# Patient Record
Sex: FEMALE | Race: WHITE | Employment: OTHER | URBAN - NONMETROPOLITAN AREA
[De-identification: names, ages, dates, MRNs, and addresses within clinical notes are randomized per-mention and may not be internally consistent; named-entity substitution may affect disease eponyms.]

---

## 2017-01-01 ENCOUNTER — OFFICE VISIT (OUTPATIENT)
Dept: NEUROLOGY | Age: 82
End: 2017-01-01
Payer: MEDICARE

## 2017-01-01 ENCOUNTER — OFFICE VISIT (OUTPATIENT)
Dept: PAIN MANAGEMENT | Age: 82
End: 2017-01-01
Payer: MEDICARE

## 2017-01-01 ENCOUNTER — TELEPHONE (OUTPATIENT)
Dept: PAIN MANAGEMENT | Age: 82
End: 2017-01-01

## 2017-01-01 ENCOUNTER — TELEPHONE (OUTPATIENT)
Dept: FAMILY MEDICINE CLINIC | Age: 82
End: 2017-01-01

## 2017-01-01 ENCOUNTER — NURSE ONLY (OUTPATIENT)
Dept: LAB | Age: 82
End: 2017-01-01
Payer: MEDICARE

## 2017-01-01 ENCOUNTER — HOSPITAL ENCOUNTER (OUTPATIENT)
Age: 82
Setting detail: OUTPATIENT SURGERY
Discharge: HOME | End: 2017-09-15
Attending: PHYSICAL MEDICINE & REHABILITATION | Admitting: PHYSICAL MEDICINE & REHABILITATION
Payer: MEDICARE

## 2017-01-01 ENCOUNTER — HOSPITAL ENCOUNTER (OUTPATIENT)
Age: 82
Setting detail: OUTPATIENT SURGERY
Discharge: HOME | End: 2017-10-03
Attending: PHYSICAL MEDICINE & REHABILITATION | Admitting: PHYSICAL MEDICINE & REHABILITATION
Payer: MEDICARE

## 2017-01-01 ENCOUNTER — HOSPITAL ENCOUNTER (EMERGENCY)
Age: 82
Discharge: HOME | End: 2017-09-18
Attending: SPECIALIST
Payer: MEDICARE

## 2017-01-01 ENCOUNTER — APPOINTMENT (OUTPATIENT)
Dept: GENERAL RADIOLOGY | Age: 82
End: 2017-01-01
Attending: PHYSICAL MEDICINE & REHABILITATION
Payer: MEDICARE

## 2017-01-01 ENCOUNTER — HOSPITAL ENCOUNTER (OUTPATIENT)
Dept: MRI IMAGING | Age: 82
Discharge: HOME | End: 2017-09-27
Payer: MEDICARE

## 2017-01-01 ENCOUNTER — APPOINTMENT (OUTPATIENT)
Dept: CT IMAGING | Age: 82
End: 2017-01-01
Payer: MEDICARE

## 2017-01-01 ENCOUNTER — HOSPITAL ENCOUNTER (OUTPATIENT)
Age: 82
Setting detail: OUTPATIENT SURGERY
Discharge: HOME | End: 2017-11-03
Attending: PHYSICAL MEDICINE & REHABILITATION | Admitting: PHYSICAL MEDICINE & REHABILITATION
Payer: MEDICARE

## 2017-01-01 ENCOUNTER — OFFICE VISIT (OUTPATIENT)
Dept: FAMILY MEDICINE CLINIC | Age: 82
End: 2017-01-01
Payer: MEDICARE

## 2017-01-01 ENCOUNTER — HOSPITAL ENCOUNTER (OUTPATIENT)
Dept: GENERAL RADIOLOGY | Age: 82
Discharge: HOME | End: 2017-09-20
Payer: MEDICARE

## 2017-01-01 VITALS
HEIGHT: 69 IN | TEMPERATURE: 97.8 F | WEIGHT: 180 LBS | RESPIRATION RATE: 16 BRPM | DIASTOLIC BLOOD PRESSURE: 75 MMHG | BODY MASS INDEX: 26.66 KG/M2 | HEART RATE: 74 BPM | SYSTOLIC BLOOD PRESSURE: 129 MMHG | OXYGEN SATURATION: 100 %

## 2017-01-01 VITALS
HEIGHT: 68 IN | WEIGHT: 184 LBS | HEART RATE: 86 BPM | DIASTOLIC BLOOD PRESSURE: 80 MMHG | BODY MASS INDEX: 27.89 KG/M2 | SYSTOLIC BLOOD PRESSURE: 124 MMHG

## 2017-01-01 VITALS
OXYGEN SATURATION: 100 % | WEIGHT: 186.4 LBS | DIASTOLIC BLOOD PRESSURE: 80 MMHG | RESPIRATION RATE: 16 BRPM | HEART RATE: 73 BPM | TEMPERATURE: 97.9 F | SYSTOLIC BLOOD PRESSURE: 139 MMHG | BODY MASS INDEX: 31.82 KG/M2 | HEIGHT: 64 IN

## 2017-01-01 VITALS
BODY MASS INDEX: 27.9 KG/M2 | HEART RATE: 98 BPM | DIASTOLIC BLOOD PRESSURE: 66 MMHG | SYSTOLIC BLOOD PRESSURE: 118 MMHG | WEIGHT: 184.08 LBS | HEIGHT: 68 IN | OXYGEN SATURATION: 98 %

## 2017-01-01 VITALS
SYSTOLIC BLOOD PRESSURE: 130 MMHG | HEIGHT: 68 IN | HEART RATE: 88 BPM | WEIGHT: 183.8 LBS | DIASTOLIC BLOOD PRESSURE: 82 MMHG | BODY MASS INDEX: 27.86 KG/M2

## 2017-01-01 VITALS
RESPIRATION RATE: 16 BRPM | HEART RATE: 80 BPM | DIASTOLIC BLOOD PRESSURE: 78 MMHG | BODY MASS INDEX: 30.66 KG/M2 | SYSTOLIC BLOOD PRESSURE: 128 MMHG | HEIGHT: 65 IN | WEIGHT: 184 LBS

## 2017-01-01 VITALS
TEMPERATURE: 97.3 F | SYSTOLIC BLOOD PRESSURE: 127 MMHG | DIASTOLIC BLOOD PRESSURE: 68 MMHG | HEART RATE: 69 BPM | RESPIRATION RATE: 18 BRPM | OXYGEN SATURATION: 95 %

## 2017-01-01 VITALS
BODY MASS INDEX: 28.34 KG/M2 | HEIGHT: 68 IN | HEART RATE: 68 BPM | WEIGHT: 187 LBS | DIASTOLIC BLOOD PRESSURE: 80 MMHG | SYSTOLIC BLOOD PRESSURE: 140 MMHG

## 2017-01-01 VITALS
BODY MASS INDEX: 27.89 KG/M2 | HEIGHT: 68 IN | TEMPERATURE: 97.9 F | WEIGHT: 184 LBS | DIASTOLIC BLOOD PRESSURE: 81 MMHG | OXYGEN SATURATION: 99 % | SYSTOLIC BLOOD PRESSURE: 146 MMHG | HEART RATE: 76 BPM | RESPIRATION RATE: 16 BRPM

## 2017-01-01 VITALS
WEIGHT: 189 LBS | SYSTOLIC BLOOD PRESSURE: 114 MMHG | HEIGHT: 69 IN | HEART RATE: 78 BPM | DIASTOLIC BLOOD PRESSURE: 88 MMHG | BODY MASS INDEX: 27.99 KG/M2

## 2017-01-01 VITALS
WEIGHT: 183 LBS | BODY MASS INDEX: 27.11 KG/M2 | HEART RATE: 92 BPM | SYSTOLIC BLOOD PRESSURE: 138 MMHG | DIASTOLIC BLOOD PRESSURE: 86 MMHG | HEIGHT: 69 IN

## 2017-01-01 VITALS
SYSTOLIC BLOOD PRESSURE: 118 MMHG | HEART RATE: 88 BPM | HEIGHT: 69 IN | WEIGHT: 182 LBS | DIASTOLIC BLOOD PRESSURE: 78 MMHG | BODY MASS INDEX: 26.96 KG/M2

## 2017-01-01 DIAGNOSIS — F01.518 VASCULAR DEMENTIA WITH BEHAVIOR DISTURBANCE: ICD-10-CM

## 2017-01-01 DIAGNOSIS — M54.50 CHRONIC LOW BACK PAIN WITHOUT SCIATICA, UNSPECIFIED BACK PAIN LATERALITY: ICD-10-CM

## 2017-01-01 DIAGNOSIS — G89.29 CHRONIC RIGHT-SIDED LOW BACK PAIN WITH RIGHT-SIDED SCIATICA: ICD-10-CM

## 2017-01-01 DIAGNOSIS — F32.A DEPRESSION, UNSPECIFIED DEPRESSION TYPE: Primary | ICD-10-CM

## 2017-01-01 DIAGNOSIS — M54.41 CHRONIC RIGHT-SIDED LOW BACK PAIN WITH RIGHT-SIDED SCIATICA: ICD-10-CM

## 2017-01-01 DIAGNOSIS — G89.29 CHRONIC LOW BACK PAIN WITHOUT SCIATICA, UNSPECIFIED BACK PAIN LATERALITY: ICD-10-CM

## 2017-01-01 DIAGNOSIS — R10.31 RLQ ABDOMINAL PAIN: Primary | ICD-10-CM

## 2017-01-01 DIAGNOSIS — Z99.89 OSA ON CPAP: ICD-10-CM

## 2017-01-01 DIAGNOSIS — M47.816 LUMBAR FACET JOINT SYNDROME: Primary | ICD-10-CM

## 2017-01-01 DIAGNOSIS — M79.602 LEFT ARM PAIN: ICD-10-CM

## 2017-01-01 DIAGNOSIS — I67.82 CHRONIC CEREBRAL ISCHEMIA: ICD-10-CM

## 2017-01-01 DIAGNOSIS — M54.50 LOW BACK PAIN RADIATING TO RIGHT LEG: ICD-10-CM

## 2017-01-01 DIAGNOSIS — M53.3 CHRONIC SI JOINT PAIN: ICD-10-CM

## 2017-01-01 DIAGNOSIS — M54.16 CHRONIC LUMBAR RADICULOPATHY: ICD-10-CM

## 2017-01-01 DIAGNOSIS — M54.41 CHRONIC RIGHT-SIDED LOW BACK PAIN WITH RIGHT-SIDED SCIATICA: Primary | ICD-10-CM

## 2017-01-01 DIAGNOSIS — F32.A DEPRESSION, UNSPECIFIED DEPRESSION TYPE: ICD-10-CM

## 2017-01-01 DIAGNOSIS — M54.16 LUMBAR RADICULOPATHY: Primary | ICD-10-CM

## 2017-01-01 DIAGNOSIS — G47.9 SLEEP DIFFICULTIES: ICD-10-CM

## 2017-01-01 DIAGNOSIS — G89.29 CHRONIC SI JOINT PAIN: ICD-10-CM

## 2017-01-01 DIAGNOSIS — F02.818 LATE ONSET ALZHEIMER'S DISEASE WITH BEHAVIORAL DISTURBANCE (HCC): Primary | ICD-10-CM

## 2017-01-01 DIAGNOSIS — R41.0 CONFUSION: ICD-10-CM

## 2017-01-01 DIAGNOSIS — M54.06 PANNICULITIS INVOLVING LUMBAR REGION: ICD-10-CM

## 2017-01-01 DIAGNOSIS — G89.29 CHRONIC RIGHT-SIDED LOW BACK PAIN WITH RIGHT-SIDED SCIATICA: Primary | ICD-10-CM

## 2017-01-01 DIAGNOSIS — M48.061 NEURAL FORAMINAL STENOSIS OF LUMBAR SPINE: ICD-10-CM

## 2017-01-01 DIAGNOSIS — H90.3 SENSORINEURAL HEARING LOSS, BILATERAL: ICD-10-CM

## 2017-01-01 DIAGNOSIS — M54.50 LOW BACK PAIN RADIATING TO RIGHT LEG: Primary | ICD-10-CM

## 2017-01-01 DIAGNOSIS — I10 ESSENTIAL HYPERTENSION: ICD-10-CM

## 2017-01-01 DIAGNOSIS — R10.31 RIGHT GROIN PAIN: ICD-10-CM

## 2017-01-01 DIAGNOSIS — M47.816 LUMBAR FACET JOINT SYNDROME: ICD-10-CM

## 2017-01-01 DIAGNOSIS — M79.604 LOW BACK PAIN RADIATING TO RIGHT LEG: Primary | ICD-10-CM

## 2017-01-01 DIAGNOSIS — F34.1 DYSTHYMIA: ICD-10-CM

## 2017-01-01 DIAGNOSIS — G45.9 TRANSIENT CEREBRAL ISCHEMIA, UNSPECIFIED TYPE: ICD-10-CM

## 2017-01-01 DIAGNOSIS — M79.604 LOW BACK PAIN RADIATING TO RIGHT LEG: ICD-10-CM

## 2017-01-01 DIAGNOSIS — M54.16 LUMBAR RADICULOPATHY: ICD-10-CM

## 2017-01-01 DIAGNOSIS — G30.1 LATE ONSET ALZHEIMER'S DISEASE WITH BEHAVIORAL DISTURBANCE (HCC): Primary | ICD-10-CM

## 2017-01-01 DIAGNOSIS — R41.3 MEMORY PROBLEM: ICD-10-CM

## 2017-01-01 DIAGNOSIS — M54.16 CHRONIC LUMBAR RADICULOPATHY: Primary | ICD-10-CM

## 2017-01-01 DIAGNOSIS — M47.816 LUMBAR FACET ARTHROPATHY: ICD-10-CM

## 2017-01-01 DIAGNOSIS — G45.0 VBI (VERTEBROBASILAR INSUFFICIENCY): ICD-10-CM

## 2017-01-01 DIAGNOSIS — R42 DIZZINESS: ICD-10-CM

## 2017-01-01 DIAGNOSIS — F32.9 REACTIVE DEPRESSION: Primary | ICD-10-CM

## 2017-01-01 DIAGNOSIS — M48.02 NEURAL FORAMINAL STENOSIS OF CERVICAL SPINE: ICD-10-CM

## 2017-01-01 DIAGNOSIS — H61.23 BILATERAL IMPACTED CERUMEN: ICD-10-CM

## 2017-01-01 DIAGNOSIS — G50.0 TRIGEMINAL NEURALGIA: ICD-10-CM

## 2017-01-01 DIAGNOSIS — G25.0 ESSENTIAL TREMOR: ICD-10-CM

## 2017-01-01 DIAGNOSIS — G47.33 OSA ON CPAP: ICD-10-CM

## 2017-01-01 LAB
-: ABNORMAL
ABSOLUTE EOS #: 0.1 K/UL (ref 0–0.4)
ABSOLUTE LYMPH #: 1.9 K/UL (ref 1–4.8)
ABSOLUTE MONO #: 0.6 K/UL (ref 0.1–1.2)
ALBUMIN SERPL-MCNC: 4 G/DL (ref 3.5–5.2)
ALBUMIN/GLOBULIN RATIO: 1.4 (ref 1–2.5)
ALP BLD-CCNC: 54 U/L (ref 35–104)
ALT SERPL-CCNC: 10 U/L (ref 5–33)
AMORPHOUS: ABNORMAL
ANION GAP SERPL CALCULATED.3IONS-SCNC: 12 MMOL/L (ref 9–17)
AST SERPL-CCNC: 16 U/L
BACTERIA: ABNORMAL
BASOPHILS # BLD: 0 % (ref 0–1)
BASOPHILS ABSOLUTE: 0 K/UL (ref 0–0.2)
BILIRUB SERPL-MCNC: 0.65 MG/DL (ref 0.3–1.2)
BILIRUBIN URINE: NEGATIVE
BUN BLDV-MCNC: 19 MG/DL (ref 8–23)
BUN/CREAT BLD: 32 (ref 9–20)
CALCIUM SERPL-MCNC: 9.3 MG/DL (ref 8.6–10.4)
CASTS UA: ABNORMAL /LPF (ref 0–2)
CHLORIDE BLD-SCNC: 102 MMOL/L (ref 98–107)
CO2: 26 MMOL/L (ref 20–31)
COLOR: ABNORMAL
COMMENT UA: ABNORMAL
CREAT SERPL-MCNC: 0.59 MG/DL (ref 0.5–0.9)
CRYSTALS, UA: ABNORMAL /HPF
DIFFERENTIAL TYPE: NORMAL
EKG ATRIAL RATE: 79 BPM
EKG P AXIS: 53 DEGREES
EKG P-R INTERVAL: 188 MS
EKG Q-T INTERVAL: 378 MS
EKG QRS DURATION: 86 MS
EKG QTC CALCULATION (BAZETT): 433 MS
EKG R AXIS: -32 DEGREES
EKG T AXIS: 24 DEGREES
EKG VENTRICULAR RATE: 79 BPM
EOSINOPHILS RELATIVE PERCENT: 1 % (ref 1–7)
EPITHELIAL CELLS UA: >50 /HPF (ref 0–5)
GFR AFRICAN AMERICAN: >60 ML/MIN
GFR NON-AFRICAN AMERICAN: >60 ML/MIN
GFR SERPL CREATININE-BSD FRML MDRD: ABNORMAL ML/MIN/{1.73_M2}
GFR SERPL CREATININE-BSD FRML MDRD: ABNORMAL ML/MIN/{1.73_M2}
GLUCOSE BLD-MCNC: 106 MG/DL (ref 70–99)
GLUCOSE URINE: NEGATIVE
HCT VFR BLD CALC: 38 % (ref 36–46)
HEMOGLOBIN: 12.7 G/DL (ref 12–16)
KETONES, URINE: NEGATIVE
LACTIC ACID: 2.2 MMOL/L (ref 0.5–2.2)
LEUKOCYTE ESTERASE, URINE: ABNORMAL
LIPASE: 16 U/L (ref 13–60)
LYMPHOCYTES # BLD: 37 % (ref 16–46)
MCH RBC QN AUTO: 31.1 PG (ref 26–34)
MCHC RBC AUTO-ENTMCNC: 33.3 G/DL (ref 31–37)
MCV RBC AUTO: 93.4 FL (ref 80–100)
MONOCYTES # BLD: 11 % (ref 4–11)
MUCUS: ABNORMAL
NITRITE, URINE: NEGATIVE
OTHER OBSERVATIONS UA: ABNORMAL
PDW BLD-RTO: 14.5 % (ref 11–14.5)
PH UA: 8.5 (ref 5–6)
PLATELET # BLD: 174 K/UL (ref 140–450)
PLATELET ESTIMATE: NORMAL
PMV BLD AUTO: 7 FL (ref 6–12)
POTASSIUM SERPL-SCNC: 4.1 MMOL/L (ref 3.7–5.3)
PROTEIN UA: ABNORMAL
RBC # BLD: 4.07 M/UL (ref 4–5.2)
RBC # BLD: NORMAL 10*6/UL
RBC UA: ABNORMAL /HPF (ref 0–4)
RENAL EPITHELIAL, UA: ABNORMAL /HPF
SEG NEUTROPHILS: 51 % (ref 43–77)
SEGMENTED NEUTROPHILS ABSOLUTE COUNT: 2.5 K/UL (ref 1.8–7.7)
SODIUM BLD-SCNC: 140 MMOL/L (ref 135–144)
SPECIFIC GRAVITY UA: 1.01 (ref 1.01–1.02)
TOTAL PROTEIN: 6.8 G/DL (ref 6.4–8.3)
TRICHOMONAS: ABNORMAL
TURBIDITY: ABNORMAL
URINE HGB: NEGATIVE
UROBILINOGEN, URINE: NORMAL
WBC # BLD: 5 K/UL (ref 3.5–11)
WBC # BLD: NORMAL 10*3/UL
WBC UA: ABNORMAL /HPF (ref 0–4)
YEAST: ABNORMAL

## 2017-01-01 PROCEDURE — 4040F PNEUMOC VAC/ADMIN/RCVD: CPT | Performed by: NURSE PRACTITIONER

## 2017-01-01 PROCEDURE — G8417 CALC BMI ABV UP PARAM F/U: HCPCS | Performed by: NURSE PRACTITIONER

## 2017-01-01 PROCEDURE — 93005 ELECTROCARDIOGRAM TRACING: CPT

## 2017-01-01 PROCEDURE — 36415 COLL VENOUS BLD VENIPUNCTURE: CPT

## 2017-01-01 PROCEDURE — 6360000002 HC RX W HCPCS: Performed by: PHYSICAL MEDICINE & REHABILITATION

## 2017-01-01 PROCEDURE — 1123F ACP DISCUSS/DSCN MKR DOCD: CPT | Performed by: NURSE PRACTITIONER

## 2017-01-01 PROCEDURE — 1090F PRES/ABSN URINE INCON ASSESS: CPT | Performed by: NURSE PRACTITIONER

## 2017-01-01 PROCEDURE — 7100000010 HC PHASE II RECOVERY - FIRST 15 MIN: Performed by: PHYSICAL MEDICINE & REHABILITATION

## 2017-01-01 PROCEDURE — 1123F ACP DISCUSS/DSCN MKR DOCD: CPT | Performed by: PHYSICAL MEDICINE & REHABILITATION

## 2017-01-01 PROCEDURE — 83690 ASSAY OF LIPASE: CPT

## 2017-01-01 PROCEDURE — G8427 DOCREV CUR MEDS BY ELIG CLIN: HCPCS | Performed by: PHYSICAL MEDICINE & REHABILITATION

## 2017-01-01 PROCEDURE — 99215 OFFICE O/P EST HI 40 MIN: CPT | Performed by: PSYCHIATRY & NEUROLOGY

## 2017-01-01 PROCEDURE — 72100 X-RAY EXAM L-S SPINE 2/3 VWS: CPT

## 2017-01-01 PROCEDURE — G8427 DOCREV CUR MEDS BY ELIG CLIN: HCPCS | Performed by: NURSE PRACTITIONER

## 2017-01-01 PROCEDURE — 1036F TOBACCO NON-USER: CPT | Performed by: NURSE PRACTITIONER

## 2017-01-01 PROCEDURE — G8399 PT W/DXA RESULTS DOCUMENT: HCPCS | Performed by: NURSE PRACTITIONER

## 2017-01-01 PROCEDURE — 96361 HYDRATE IV INFUSION ADD-ON: CPT

## 2017-01-01 PROCEDURE — G8484 FLU IMMUNIZE NO ADMIN: HCPCS | Performed by: NURSE PRACTITIONER

## 2017-01-01 PROCEDURE — 80053 COMPREHEN METABOLIC PANEL: CPT

## 2017-01-01 PROCEDURE — 2500000003 HC RX 250 WO HCPCS: Performed by: PHYSICAL MEDICINE & REHABILITATION

## 2017-01-01 PROCEDURE — 99284 EMERGENCY DEPT VISIT MOD MDM: CPT

## 2017-01-01 PROCEDURE — 96374 THER/PROPH/DIAG INJ IV PUSH: CPT

## 2017-01-01 PROCEDURE — 2580000003 HC RX 258: Performed by: PHYSICAL MEDICINE & REHABILITATION

## 2017-01-01 PROCEDURE — 1036F TOBACCO NON-USER: CPT | Performed by: PSYCHIATRY & NEUROLOGY

## 2017-01-01 PROCEDURE — 3209999900 FLUORO FOR SURGICAL PROCEDURES

## 2017-01-01 PROCEDURE — G8417 CALC BMI ABV UP PARAM F/U: HCPCS | Performed by: PSYCHIATRY & NEUROLOGY

## 2017-01-01 PROCEDURE — 64483 NJX AA&/STRD TFRM EPI L/S 1: CPT | Performed by: PHYSICAL MEDICINE & REHABILITATION

## 2017-01-01 PROCEDURE — G8417 CALC BMI ABV UP PARAM F/U: HCPCS | Performed by: PHYSICAL MEDICINE & REHABILITATION

## 2017-01-01 PROCEDURE — 99204 OFFICE O/P NEW MOD 45 MIN: CPT | Performed by: PHYSICAL MEDICINE & REHABILITATION

## 2017-01-01 PROCEDURE — 3600000056 HC PAIN LEVEL 4 BASE: Performed by: PHYSICAL MEDICINE & REHABILITATION

## 2017-01-01 PROCEDURE — 73502 X-RAY EXAM HIP UNI 2-3 VIEWS: CPT

## 2017-01-01 PROCEDURE — L0625 LO FLEX L1-BELOW L5 PRE OTS: HCPCS | Performed by: NURSE PRACTITIONER

## 2017-01-01 PROCEDURE — 64484 NJX AA&/STRD TFRM EPI L/S EA: CPT | Performed by: PHYSICAL MEDICINE & REHABILITATION

## 2017-01-01 PROCEDURE — 6360000004 HC RX CONTRAST MEDICATION: Performed by: PHYSICAL MEDICINE & REHABILITATION

## 2017-01-01 PROCEDURE — 1036F TOBACCO NON-USER: CPT | Performed by: PHYSICAL MEDICINE & REHABILITATION

## 2017-01-01 PROCEDURE — G8399 PT W/DXA RESULTS DOCUMENT: HCPCS | Performed by: PHYSICAL MEDICINE & REHABILITATION

## 2017-01-01 PROCEDURE — 4040F PNEUMOC VAC/ADMIN/RCVD: CPT | Performed by: PSYCHIATRY & NEUROLOGY

## 2017-01-01 PROCEDURE — 99213 OFFICE O/P EST LOW 20 MIN: CPT | Performed by: NURSE PRACTITIONER

## 2017-01-01 PROCEDURE — G8484 FLU IMMUNIZE NO ADMIN: HCPCS | Performed by: PSYCHIATRY & NEUROLOGY

## 2017-01-01 PROCEDURE — 64493 INJ PARAVERT F JNT L/S 1 LEV: CPT | Performed by: PHYSICAL MEDICINE & REHABILITATION

## 2017-01-01 PROCEDURE — 64495 INJ PARAVERT F JNT L/S 3 LEV: CPT | Performed by: PHYSICAL MEDICINE & REHABILITATION

## 2017-01-01 PROCEDURE — 1090F PRES/ABSN URINE INCON ASSESS: CPT | Performed by: PHYSICAL MEDICINE & REHABILITATION

## 2017-01-01 PROCEDURE — A6413 ADHESIVE BANDAGE, FIRST-AID: HCPCS | Performed by: PHYSICAL MEDICINE & REHABILITATION

## 2017-01-01 PROCEDURE — 6360000004 HC RX CONTRAST MEDICATION: Performed by: SPECIALIST

## 2017-01-01 PROCEDURE — 2580000003 HC RX 258: Performed by: SPECIALIST

## 2017-01-01 PROCEDURE — 85025 COMPLETE CBC W/AUTO DIFF WBC: CPT

## 2017-01-01 PROCEDURE — 81001 URINALYSIS AUTO W/SCOPE: CPT

## 2017-01-01 PROCEDURE — 4040F PNEUMOC VAC/ADMIN/RCVD: CPT | Performed by: PHYSICAL MEDICINE & REHABILITATION

## 2017-01-01 PROCEDURE — G8419 CALC BMI OUT NRM PARAM NOF/U: HCPCS | Performed by: NURSE PRACTITIONER

## 2017-01-01 PROCEDURE — 99214 OFFICE O/P EST MOD 30 MIN: CPT | Performed by: NURSE PRACTITIONER

## 2017-01-01 PROCEDURE — 83605 ASSAY OF LACTIC ACID: CPT

## 2017-01-01 PROCEDURE — G8399 PT W/DXA RESULTS DOCUMENT: HCPCS | Performed by: PSYCHIATRY & NEUROLOGY

## 2017-01-01 PROCEDURE — 64494 INJ PARAVERT F JNT L/S 2 LEV: CPT | Performed by: PHYSICAL MEDICINE & REHABILITATION

## 2017-01-01 PROCEDURE — 74177 CT ABD & PELVIS W/CONTRAST: CPT

## 2017-01-01 PROCEDURE — 96372 THER/PROPH/DIAG INJ SC/IM: CPT | Performed by: NURSE PRACTITIONER

## 2017-01-01 PROCEDURE — 6360000002 HC RX W HCPCS: Performed by: SPECIALIST

## 2017-01-01 PROCEDURE — 69209 REMOVE IMPACTED EAR WAX UNI: CPT | Performed by: NURSE PRACTITIONER

## 2017-01-01 PROCEDURE — 3600000057 HC PAIN LEVEL 4 ADDL 15 MIN: Performed by: PHYSICAL MEDICINE & REHABILITATION

## 2017-01-01 PROCEDURE — 1090F PRES/ABSN URINE INCON ASSESS: CPT | Performed by: PSYCHIATRY & NEUROLOGY

## 2017-01-01 PROCEDURE — 72148 MRI LUMBAR SPINE W/O DYE: CPT

## 2017-01-01 PROCEDURE — 1123F ACP DISCUSS/DSCN MKR DOCD: CPT | Performed by: PSYCHIATRY & NEUROLOGY

## 2017-01-01 PROCEDURE — G8427 DOCREV CUR MEDS BY ELIG CLIN: HCPCS | Performed by: PSYCHIATRY & NEUROLOGY

## 2017-01-01 RX ORDER — BUPIVACAINE HYDROCHLORIDE 5 MG/ML
INJECTION, SOLUTION EPIDURAL; INTRACAUDAL PRN
Status: DISCONTINUED | OUTPATIENT
Start: 2017-01-01 | End: 2017-01-01 | Stop reason: HOSPADM

## 2017-01-01 RX ORDER — LIDOCAINE HYDROCHLORIDE 20 MG/ML
INJECTION, SOLUTION EPIDURAL; INFILTRATION; INTRACAUDAL; PERINEURAL PRN
Status: DISCONTINUED | OUTPATIENT
Start: 2017-01-01 | End: 2017-01-01 | Stop reason: HOSPADM

## 2017-01-01 RX ORDER — 0.9 % SODIUM CHLORIDE 0.9 %
250 INTRAVENOUS SOLUTION INTRAVENOUS ONCE
Status: COMPLETED | OUTPATIENT
Start: 2017-01-01 | End: 2017-01-01

## 2017-01-01 RX ORDER — 0.9 % SODIUM CHLORIDE 0.9 %
VIAL (ML) INJECTION PRN
Status: DISCONTINUED | OUTPATIENT
Start: 2017-01-01 | End: 2017-01-01 | Stop reason: HOSPADM

## 2017-01-01 RX ORDER — DEXAMETHASONE SODIUM PHOSPHATE 10 MG/ML
INJECTION INTRAMUSCULAR; INTRAVENOUS PRN
Status: DISCONTINUED | OUTPATIENT
Start: 2017-01-01 | End: 2017-01-01 | Stop reason: HOSPADM

## 2017-01-01 RX ORDER — KETOROLAC TROMETHAMINE 30 MG/ML
60 INJECTION, SOLUTION INTRAMUSCULAR; INTRAVENOUS ONCE
Status: COMPLETED | OUTPATIENT
Start: 2017-01-01 | End: 2017-01-01

## 2017-01-01 RX ORDER — SODIUM CHLORIDE 9 MG/ML
INJECTION, SOLUTION INTRAVENOUS CONTINUOUS
Status: DISCONTINUED | OUTPATIENT
Start: 2017-01-01 | End: 2017-01-01 | Stop reason: HOSPADM

## 2017-01-01 RX ORDER — MORPHINE SULFATE 2 MG/ML
2 INJECTION, SOLUTION INTRAMUSCULAR; INTRAVENOUS ONCE
Status: COMPLETED | OUTPATIENT
Start: 2017-01-01 | End: 2017-01-01

## 2017-01-01 RX ORDER — MEMANTINE HYDROCHLORIDE 10 MG/1
TABLET ORAL
Qty: 180 TABLET | Refills: 3 | Status: SHIPPED | OUTPATIENT
Start: 2017-01-01

## 2017-01-01 RX ORDER — GABAPENTIN 100 MG/1
CAPSULE ORAL
Qty: 90 CAPSULE | Refills: 3 | Status: SHIPPED | OUTPATIENT
Start: 2017-01-01 | End: 2018-01-01 | Stop reason: SDUPTHER

## 2017-01-01 RX ORDER — HYDROCODONE BITARTRATE AND ACETAMINOPHEN 5; 325 MG/1; MG/1
1 TABLET ORAL EVERY 4 HOURS PRN
Qty: 60 TABLET | Refills: 0 | Status: SHIPPED | OUTPATIENT
Start: 2017-01-01 | End: 2017-01-01 | Stop reason: SDUPTHER

## 2017-01-01 RX ORDER — METHYLPREDNISOLONE 4 MG/1
TABLET ORAL
Qty: 1 KIT | Refills: 0 | Status: SHIPPED | OUTPATIENT
Start: 2017-01-01 | End: 2017-01-01

## 2017-01-01 RX ORDER — SERTRALINE HYDROCHLORIDE 100 MG/1
TABLET, FILM COATED ORAL
Qty: 90 TABLET | Refills: 3 | Status: SHIPPED | OUTPATIENT
Start: 2017-01-01

## 2017-01-01 RX ORDER — CYCLOBENZAPRINE HCL 5 MG
5 TABLET ORAL 3 TIMES DAILY PRN
Qty: 30 TABLET | Refills: 3 | Status: SHIPPED | OUTPATIENT
Start: 2017-01-01 | End: 2018-01-01 | Stop reason: ALTCHOICE

## 2017-01-01 RX ORDER — CYCLOBENZAPRINE HCL 5 MG
5 TABLET ORAL 3 TIMES DAILY PRN
Qty: 15 TABLET | Refills: 0 | Status: SHIPPED | OUTPATIENT
Start: 2017-01-01 | End: 2017-01-01 | Stop reason: SDUPTHER

## 2017-01-01 RX ORDER — HYDROCODONE BITARTRATE AND ACETAMINOPHEN 5; 325 MG/1; MG/1
1 TABLET ORAL EVERY 8 HOURS PRN
Qty: 42 TABLET | Refills: 0 | Status: SHIPPED | OUTPATIENT
Start: 2017-01-01 | End: 2018-01-01 | Stop reason: SDUPTHER

## 2017-01-01 RX ORDER — DONEPEZIL HYDROCHLORIDE 10 MG/1
TABLET, FILM COATED ORAL
Qty: 90 TABLET | Refills: 3 | Status: SHIPPED | OUTPATIENT
Start: 2017-01-01 | End: 2018-01-01 | Stop reason: SDUPTHER

## 2017-01-01 RX ADMIN — SODIUM CHLORIDE 250 ML: 9 INJECTION, SOLUTION INTRAVENOUS at 11:42

## 2017-01-01 RX ADMIN — KETOROLAC TROMETHAMINE 60 MG: 30 INJECTION, SOLUTION INTRAMUSCULAR; INTRAVENOUS at 11:32

## 2017-01-01 RX ADMIN — IOHEXOL 130 ML: 350 INJECTION, SOLUTION INTRAVENOUS at 13:18

## 2017-01-01 RX ADMIN — MORPHINE SULFATE 2 MG: 2 INJECTION, SOLUTION INTRAMUSCULAR; INTRAVENOUS at 11:42

## 2017-01-01 RX ADMIN — SODIUM CHLORIDE: 9 INJECTION, SOLUTION INTRAVENOUS at 12:49

## 2017-01-01 ASSESSMENT — PAIN - FUNCTIONAL ASSESSMENT
PAIN_FUNCTIONAL_ASSESSMENT: 0-10

## 2017-01-01 ASSESSMENT — ENCOUNTER SYMPTOMS
EYE DISCHARGE: 0
CONSTIPATION: 0
PHOTOPHOBIA: 0
ABDOMINAL DISTENTION: 0
CONSTIPATION: 0
EYE PAIN: 0
BACK PAIN: 1
SHORTNESS OF BREATH: 0
SINUS PRESSURE: 0
VOMITING: 0
BACK PAIN: 1
ABDOMINAL PAIN: 1
BACK PAIN: 1
ABDOMINAL PAIN: 0
EYES NEGATIVE: 1
VOICE CHANGE: 0
SORE THROAT: 0
EYE REDNESS: 0
COLOR CHANGE: 0
DIARRHEA: 0
RESPIRATORY NEGATIVE: 1
EYE ITCHING: 0
WHEEZING: 0
CHOKING: 0
BACK PAIN: 1
DIARRHEA: 0
BLOOD IN STOOL: 0
TROUBLE SWALLOWING: 0
VISUAL CHANGE: 0
BACK PAIN: 1
APNEA: 0
ALLERGIC/IMMUNOLOGIC NEGATIVE: 1
FACIAL SWELLING: 0
NAUSEA: 0
COUGH: 0
CHEST TIGHTNESS: 0
BOWEL INCONTINENCE: 0
SHORTNESS OF BREATH: 0

## 2017-01-01 ASSESSMENT — PAIN DESCRIPTION - DESCRIPTORS
DESCRIPTORS: SHARP
DESCRIPTORS: CONSTANT
DESCRIPTORS: DULL

## 2017-01-01 ASSESSMENT — PAIN SCALES - GENERAL
PAINLEVEL_OUTOF10: 10
PAINLEVEL_OUTOF10: 0
PAINLEVEL_OUTOF10: 4
PAINLEVEL_OUTOF10: 2
PAINLEVEL_OUTOF10: 3

## 2017-01-01 ASSESSMENT — PAIN DESCRIPTION - ONSET
ONSET: ON-GOING
ONSET: ON-GOING

## 2017-01-01 ASSESSMENT — PAIN DESCRIPTION - LOCATION
LOCATION: FLANK
LOCATION: BACK
LOCATION: FLANK

## 2017-01-01 ASSESSMENT — PAIN DESCRIPTION - PAIN TYPE
TYPE: ACUTE PAIN
TYPE: CHRONIC PAIN

## 2017-01-01 ASSESSMENT — PAIN DESCRIPTION - FREQUENCY
FREQUENCY: CONTINUOUS
FREQUENCY: CONTINUOUS

## 2017-01-01 ASSESSMENT — PAIN DESCRIPTION - ORIENTATION
ORIENTATION: RIGHT
ORIENTATION: RIGHT

## 2017-01-01 ASSESSMENT — PAIN DESCRIPTION - PROGRESSION
CLINICAL_PROGRESSION: GRADUALLY WORSENING
CLINICAL_PROGRESSION: GRADUALLY IMPROVING

## 2017-01-05 DIAGNOSIS — N31.8 HYPERACTIVITY OF BLADDER: ICD-10-CM

## 2017-01-05 DIAGNOSIS — N39.41 URGE INCONTINENCE OF URINE: Primary | ICD-10-CM

## 2017-01-05 RX ORDER — OXYBUTYNIN CHLORIDE 5 MG/1
5 TABLET, EXTENDED RELEASE ORAL DAILY
Qty: 30 TABLET | Refills: 3 | Status: SHIPPED | OUTPATIENT
Start: 2017-01-05 | End: 2017-03-24

## 2017-01-05 RX ORDER — MONTELUKAST SODIUM 10 MG/1
TABLET ORAL
Qty: 90 TABLET | Refills: 3 | Status: SHIPPED | OUTPATIENT
Start: 2017-01-05 | End: 2018-01-01 | Stop reason: SDUPTHER

## 2017-01-17 ENCOUNTER — OFFICE VISIT (OUTPATIENT)
Dept: FAMILY MEDICINE CLINIC | Age: 82
End: 2017-01-17

## 2017-01-17 VITALS
BODY MASS INDEX: 29.1 KG/M2 | WEIGHT: 192 LBS | SYSTOLIC BLOOD PRESSURE: 122 MMHG | HEIGHT: 68 IN | DIASTOLIC BLOOD PRESSURE: 70 MMHG | HEART RATE: 92 BPM

## 2017-01-17 DIAGNOSIS — M25.612 DECREASED ROM OF LEFT SHOULDER: ICD-10-CM

## 2017-01-17 DIAGNOSIS — R41.3 MEMORY PROBLEM: ICD-10-CM

## 2017-01-17 DIAGNOSIS — G47.9 SLEEP DIFFICULTIES: ICD-10-CM

## 2017-01-17 DIAGNOSIS — G47.33 OSA ON CPAP: Primary | ICD-10-CM

## 2017-01-17 DIAGNOSIS — Z99.89 OSA ON CPAP: Primary | ICD-10-CM

## 2017-01-17 DIAGNOSIS — R29.898 DECREASED GRIP STRENGTH OF LEFT HAND: ICD-10-CM

## 2017-01-17 DIAGNOSIS — R41.3 MEMORY LOSS: ICD-10-CM

## 2017-01-17 DIAGNOSIS — M25.622 DECREASED ROM OF LEFT ELBOW: ICD-10-CM

## 2017-01-17 DIAGNOSIS — R41.0 CONFUSION: ICD-10-CM

## 2017-01-17 DIAGNOSIS — M79.602 LEFT ARM PAIN: ICD-10-CM

## 2017-01-17 DIAGNOSIS — Z91.81 AT HIGH RISK FOR FALLS: ICD-10-CM

## 2017-01-17 DIAGNOSIS — B37.2 CUTANEOUS CANDIDIASIS: ICD-10-CM

## 2017-01-17 PROCEDURE — 1090F PRES/ABSN URINE INCON ASSESS: CPT | Performed by: NURSE PRACTITIONER

## 2017-01-17 PROCEDURE — G8427 DOCREV CUR MEDS BY ELIG CLIN: HCPCS | Performed by: NURSE PRACTITIONER

## 2017-01-17 PROCEDURE — 1123F ACP DISCUSS/DSCN MKR DOCD: CPT | Performed by: NURSE PRACTITIONER

## 2017-01-17 PROCEDURE — 99214 OFFICE O/P EST MOD 30 MIN: CPT | Performed by: NURSE PRACTITIONER

## 2017-01-17 PROCEDURE — G8484 FLU IMMUNIZE NO ADMIN: HCPCS | Performed by: NURSE PRACTITIONER

## 2017-01-17 PROCEDURE — G8399 PT W/DXA RESULTS DOCUMENT: HCPCS | Performed by: NURSE PRACTITIONER

## 2017-01-17 PROCEDURE — 4040F PNEUMOC VAC/ADMIN/RCVD: CPT | Performed by: NURSE PRACTITIONER

## 2017-01-17 PROCEDURE — 1036F TOBACCO NON-USER: CPT | Performed by: NURSE PRACTITIONER

## 2017-01-17 PROCEDURE — G8420 CALC BMI NORM PARAMETERS: HCPCS | Performed by: NURSE PRACTITIONER

## 2017-01-17 RX ORDER — NYSTATIN 100000 [USP'U]/G
POWDER TOPICAL
Qty: 1 BOTTLE | Refills: 2 | Status: SHIPPED | OUTPATIENT
Start: 2017-01-17 | End: 2017-01-01 | Stop reason: ALTCHOICE

## 2017-01-18 ASSESSMENT — ENCOUNTER SYMPTOMS
ALLERGIC/IMMUNOLOGIC NEGATIVE: 1
RESPIRATORY NEGATIVE: 1
GASTROINTESTINAL NEGATIVE: 1
EYES NEGATIVE: 1

## 2017-01-19 DIAGNOSIS — S62.102S LEFT WRIST FRACTURE, SEQUELA: Primary | ICD-10-CM

## 2017-01-23 ENCOUNTER — OFFICE VISIT (OUTPATIENT)
Dept: ORTHOPEDIC SURGERY | Age: 82
End: 2017-01-23

## 2017-01-23 ENCOUNTER — TELEPHONE (OUTPATIENT)
Dept: FAMILY MEDICINE CLINIC | Age: 82
End: 2017-01-23

## 2017-01-23 VITALS
HEIGHT: 68 IN | DIASTOLIC BLOOD PRESSURE: 72 MMHG | BODY MASS INDEX: 29.1 KG/M2 | WEIGHT: 192 LBS | HEART RATE: 80 BPM | SYSTOLIC BLOOD PRESSURE: 114 MMHG

## 2017-01-23 DIAGNOSIS — S52.92XG: ICD-10-CM

## 2017-01-23 DIAGNOSIS — R53.1 GENERALIZED WEAKNESS: ICD-10-CM

## 2017-01-23 DIAGNOSIS — S62.102S LEFT WRIST FRACTURE, SEQUELA: Primary | ICD-10-CM

## 2017-01-23 DIAGNOSIS — R41.3 MEMORY PROBLEM: ICD-10-CM

## 2017-01-23 DIAGNOSIS — R41.0 CONFUSION: Primary | ICD-10-CM

## 2017-01-23 PROCEDURE — 99024 POSTOP FOLLOW-UP VISIT: CPT | Performed by: PHYSICIAN ASSISTANT

## 2017-01-23 RX ORDER — HYDROCODONE BITARTRATE AND ACETAMINOPHEN 5; 325 MG/1; MG/1
1-2 TABLET ORAL EVERY 6 HOURS PRN
Qty: 50 TABLET | Refills: 0 | Status: SHIPPED | OUTPATIENT
Start: 2017-01-23 | End: 2017-01-30

## 2017-01-24 ENCOUNTER — TELEPHONE (OUTPATIENT)
Dept: FAMILY MEDICINE CLINIC | Age: 82
End: 2017-01-24

## 2017-01-30 ENCOUNTER — TELEPHONE (OUTPATIENT)
Dept: FAMILY MEDICINE CLINIC | Age: 82
End: 2017-01-30

## 2017-02-16 DIAGNOSIS — S62.102S LEFT WRIST FRACTURE, SEQUELA: Primary | ICD-10-CM

## 2017-02-20 ENCOUNTER — OFFICE VISIT (OUTPATIENT)
Dept: ORTHOPEDIC SURGERY | Age: 82
End: 2017-02-20

## 2017-02-20 VITALS
BODY MASS INDEX: 29.1 KG/M2 | DIASTOLIC BLOOD PRESSURE: 76 MMHG | HEIGHT: 68 IN | HEART RATE: 98 BPM | WEIGHT: 192 LBS | SYSTOLIC BLOOD PRESSURE: 114 MMHG

## 2017-02-20 DIAGNOSIS — S62.102S LEFT WRIST FRACTURE, SEQUELA: Primary | ICD-10-CM

## 2017-02-20 PROCEDURE — 99024 POSTOP FOLLOW-UP VISIT: CPT | Performed by: PHYSICIAN ASSISTANT

## 2017-03-24 ENCOUNTER — OFFICE VISIT (OUTPATIENT)
Dept: FAMILY MEDICINE CLINIC | Age: 82
End: 2017-03-24
Payer: MEDICARE

## 2017-03-24 VITALS
SYSTOLIC BLOOD PRESSURE: 122 MMHG | DIASTOLIC BLOOD PRESSURE: 78 MMHG | WEIGHT: 193 LBS | HEIGHT: 68 IN | BODY MASS INDEX: 29.25 KG/M2 | HEART RATE: 88 BPM

## 2017-03-24 DIAGNOSIS — M79.602 LEFT ARM PAIN: Primary | ICD-10-CM

## 2017-03-24 DIAGNOSIS — Z23 NEED FOR VACCINATION WITH 13-POLYVALENT PNEUMOCOCCAL CONJUGATE VACCINE: ICD-10-CM

## 2017-03-24 DIAGNOSIS — N31.8 HYPERACTIVITY OF BLADDER: ICD-10-CM

## 2017-03-24 DIAGNOSIS — R41.3 MEMORY PROBLEM: ICD-10-CM

## 2017-03-24 DIAGNOSIS — Z23 NEED FOR TDAP VACCINATION: ICD-10-CM

## 2017-03-24 DIAGNOSIS — N39.45 CONTINUOUS LEAKAGE OF URINE: ICD-10-CM

## 2017-03-24 PROCEDURE — G8420 CALC BMI NORM PARAMETERS: HCPCS | Performed by: NURSE PRACTITIONER

## 2017-03-24 PROCEDURE — 4040F PNEUMOC VAC/ADMIN/RCVD: CPT | Performed by: NURSE PRACTITIONER

## 2017-03-24 PROCEDURE — 99214 OFFICE O/P EST MOD 30 MIN: CPT | Performed by: NURSE PRACTITIONER

## 2017-03-24 PROCEDURE — 1123F ACP DISCUSS/DSCN MKR DOCD: CPT | Performed by: NURSE PRACTITIONER

## 2017-03-24 PROCEDURE — G8399 PT W/DXA RESULTS DOCUMENT: HCPCS | Performed by: NURSE PRACTITIONER

## 2017-03-24 PROCEDURE — G8427 DOCREV CUR MEDS BY ELIG CLIN: HCPCS | Performed by: NURSE PRACTITIONER

## 2017-03-24 PROCEDURE — 1090F PRES/ABSN URINE INCON ASSESS: CPT | Performed by: NURSE PRACTITIONER

## 2017-03-24 PROCEDURE — G8484 FLU IMMUNIZE NO ADMIN: HCPCS | Performed by: NURSE PRACTITIONER

## 2017-03-24 PROCEDURE — 0509F URINE INCON PLAN DOCD: CPT | Performed by: NURSE PRACTITIONER

## 2017-03-24 PROCEDURE — 1036F TOBACCO NON-USER: CPT | Performed by: NURSE PRACTITIONER

## 2017-03-24 RX ORDER — HYDROCODONE BITARTRATE AND ACETAMINOPHEN 5; 325 MG/1; MG/1
1 TABLET ORAL EVERY 4 HOURS PRN
Qty: 40 TABLET | Refills: 0 | Status: SHIPPED | OUTPATIENT
Start: 2017-03-24 | End: 2017-01-01 | Stop reason: SDUPTHER

## 2017-03-24 RX ORDER — ASPIRIN 325 MG
325 TABLET, DELAYED RELEASE (ENTERIC COATED) ORAL DAILY
Qty: 30 TABLET | Status: SHIPPED | COMMUNITY
Start: 2017-03-24

## 2017-03-24 ASSESSMENT — PATIENT HEALTH QUESTIONNAIRE - PHQ9
SUM OF ALL RESPONSES TO PHQ9 QUESTIONS 1 & 2: 0
SUM OF ALL RESPONSES TO PHQ QUESTIONS 1-9: 0
2. FEELING DOWN, DEPRESSED OR HOPELESS: 0
1. LITTLE INTEREST OR PLEASURE IN DOING THINGS: 0

## 2017-03-29 DIAGNOSIS — N39.41 URGE INCONTINENCE OF URINE: ICD-10-CM

## 2017-03-29 DIAGNOSIS — N31.8 HYPERACTIVITY OF BLADDER: ICD-10-CM

## 2017-03-29 RX ORDER — OXYBUTYNIN CHLORIDE 5 MG/1
5 TABLET, EXTENDED RELEASE ORAL DAILY
Qty: 90 TABLET | Refills: 3 | Status: SHIPPED | OUTPATIENT
Start: 2017-03-29 | End: 2018-01-01 | Stop reason: CLARIF

## 2017-04-02 ENCOUNTER — APPOINTMENT (OUTPATIENT)
Dept: CT IMAGING | Age: 82
End: 2017-04-02
Payer: MEDICARE

## 2017-04-02 ENCOUNTER — HOSPITAL ENCOUNTER (EMERGENCY)
Age: 82
Discharge: HOME | End: 2017-04-02
Attending: EMERGENCY MEDICINE
Payer: MEDICARE

## 2017-04-02 ENCOUNTER — OFFICE VISIT (OUTPATIENT)
Dept: PRIMARY CARE CLINIC | Age: 82
End: 2017-04-02

## 2017-04-02 VITALS
RESPIRATION RATE: 16 BRPM | HEART RATE: 91 BPM | BODY MASS INDEX: 29.62 KG/M2 | SYSTOLIC BLOOD PRESSURE: 130 MMHG | HEIGHT: 69 IN | DIASTOLIC BLOOD PRESSURE: 79 MMHG | WEIGHT: 200 LBS | OXYGEN SATURATION: 97 % | TEMPERATURE: 98.4 F

## 2017-04-02 DIAGNOSIS — R10.84 GENERALIZED ABDOMINAL PAIN: Primary | ICD-10-CM

## 2017-04-02 LAB
ABSOLUTE EOS #: 0.1 K/UL (ref 0–0.4)
ABSOLUTE LYMPH #: 1.9 K/UL (ref 1–4.8)
ABSOLUTE MONO #: 0.8 K/UL (ref 0.1–1.2)
ALBUMIN SERPL-MCNC: 4 G/DL (ref 3.5–5.2)
ALBUMIN/GLOBULIN RATIO: 1.4 (ref 1–2.5)
ALP BLD-CCNC: 61 U/L (ref 35–104)
ALT SERPL-CCNC: 13 U/L (ref 5–33)
AMYLASE: 52 U/L (ref 28–100)
ANION GAP SERPL CALCULATED.3IONS-SCNC: 12 MMOL/L (ref 9–17)
AST SERPL-CCNC: 20 U/L
BASOPHILS # BLD: 1 % (ref 0–2)
BASOPHILS ABSOLUTE: 0.1 K/UL (ref 0–0.2)
BILIRUB SERPL-MCNC: 0.4 MG/DL (ref 0.3–1.2)
BILIRUBIN DIRECT: 0.1 MG/DL
BILIRUBIN, INDIRECT: 0.3 MG/DL (ref 0–1)
BUN BLDV-MCNC: 13 MG/DL (ref 8–23)
BUN/CREAT BLD: 23 (ref 9–20)
CALCIUM SERPL-MCNC: 9.1 MG/DL (ref 8.6–10.4)
CHLORIDE BLD-SCNC: 101 MMOL/L (ref 98–107)
CO2: 24 MMOL/L (ref 20–31)
CREAT SERPL-MCNC: 0.57 MG/DL (ref 0.5–0.9)
DIFFERENTIAL TYPE: ABNORMAL
EOSINOPHILS RELATIVE PERCENT: 2 % (ref 1–4)
GFR AFRICAN AMERICAN: >60 ML/MIN
GFR NON-AFRICAN AMERICAN: >60 ML/MIN
GFR SERPL CREATININE-BSD FRML MDRD: ABNORMAL ML/MIN/{1.73_M2}
GFR SERPL CREATININE-BSD FRML MDRD: ABNORMAL ML/MIN/{1.73_M2}
GLOBULIN: 2.9 G/DL (ref 1.5–3.8)
GLUCOSE BLD-MCNC: 141 MG/DL (ref 70–99)
HCT VFR BLD CALC: 37.1 % (ref 36–46)
HEMOGLOBIN: 12.4 G/DL (ref 12–16)
LIPASE: 23 U/L (ref 13–60)
LYMPHOCYTES # BLD: 32 % (ref 24–44)
MCH RBC QN AUTO: 30.6 PG (ref 26–34)
MCHC RBC AUTO-ENTMCNC: 33.5 G/DL (ref 31–37)
MCV RBC AUTO: 91.4 FL (ref 80–100)
MONOCYTES # BLD: 13 % (ref 1–7)
PDW BLD-RTO: 15.2 % (ref 11–14.5)
PLATELET # BLD: 152 K/UL (ref 140–450)
PLATELET ESTIMATE: ABNORMAL
PMV BLD AUTO: 7.1 FL (ref 6–12)
POTASSIUM SERPL-SCNC: 4.1 MMOL/L (ref 3.7–5.3)
RBC # BLD: 4.06 M/UL (ref 4–5.2)
RBC # BLD: ABNORMAL 10*6/UL
SEG NEUTROPHILS: 52 % (ref 36–66)
SEGMENTED NEUTROPHILS ABSOLUTE COUNT: 3.1 K/UL (ref 1.8–7.7)
SODIUM BLD-SCNC: 137 MMOL/L (ref 135–144)
TOTAL PROTEIN: 6.9 G/DL (ref 6.4–8.3)
WBC # BLD: 5.9 K/UL (ref 3.5–11)
WBC # BLD: ABNORMAL 10*3/UL

## 2017-04-02 PROCEDURE — 80076 HEPATIC FUNCTION PANEL: CPT

## 2017-04-02 PROCEDURE — 74176 CT ABD & PELVIS W/O CONTRAST: CPT

## 2017-04-02 PROCEDURE — 74176 CT ABD & PELVIS W/O CONTRAST: CPT | Performed by: RADIOLOGY

## 2017-04-02 PROCEDURE — 85025 COMPLETE CBC W/AUTO DIFF WBC: CPT

## 2017-04-02 PROCEDURE — 82150 ASSAY OF AMYLASE: CPT

## 2017-04-02 PROCEDURE — 80048 BASIC METABOLIC PNL TOTAL CA: CPT

## 2017-04-02 PROCEDURE — 99999 PR OFFICE/OUTPT VISIT,PROCEDURE ONLY: CPT | Performed by: FAMILY MEDICINE

## 2017-04-02 PROCEDURE — 99285 EMERGENCY DEPT VISIT HI MDM: CPT

## 2017-04-02 PROCEDURE — 83690 ASSAY OF LIPASE: CPT

## 2017-04-02 PROCEDURE — 36415 COLL VENOUS BLD VENIPUNCTURE: CPT

## 2017-04-02 PROCEDURE — 1036F TOBACCO NON-USER: CPT | Performed by: FAMILY MEDICINE

## 2017-04-02 RX ORDER — SIMETHICONE 125 MG
125 TABLET,CHEWABLE ORAL EVERY 6 HOURS PRN
Qty: 30 TABLET | Refills: 0 | Status: SHIPPED | OUTPATIENT
Start: 2017-04-02

## 2017-04-02 ASSESSMENT — PAIN DESCRIPTION - DESCRIPTORS: DESCRIPTORS: ACHING;DULL

## 2017-04-02 ASSESSMENT — PAIN SCALES - GENERAL: PAINLEVEL_OUTOF10: 3

## 2017-04-02 ASSESSMENT — PAIN DESCRIPTION - LOCATION: LOCATION: ABDOMEN;BACK

## 2017-04-02 ASSESSMENT — PAIN DESCRIPTION - PAIN TYPE: TYPE: ACUTE PAIN

## 2017-04-03 ENCOUNTER — OFFICE VISIT (OUTPATIENT)
Dept: FAMILY MEDICINE CLINIC | Age: 82
End: 2017-04-03
Payer: MEDICARE

## 2017-04-03 ENCOUNTER — OFFICE VISIT (OUTPATIENT)
Dept: NEUROLOGY | Age: 82
End: 2017-04-03
Payer: MEDICARE

## 2017-04-03 VITALS
HEART RATE: 78 BPM | SYSTOLIC BLOOD PRESSURE: 132 MMHG | WEIGHT: 195 LBS | HEIGHT: 68 IN | BODY MASS INDEX: 29.55 KG/M2 | DIASTOLIC BLOOD PRESSURE: 80 MMHG

## 2017-04-03 VITALS
HEART RATE: 106 BPM | WEIGHT: 196.2 LBS | BODY MASS INDEX: 29.06 KG/M2 | DIASTOLIC BLOOD PRESSURE: 80 MMHG | HEIGHT: 69 IN | SYSTOLIC BLOOD PRESSURE: 134 MMHG

## 2017-04-03 DIAGNOSIS — M54.16 CHRONIC LUMBAR RADICULOPATHY: ICD-10-CM

## 2017-04-03 DIAGNOSIS — G45.9 TRANSIENT CEREBRAL ISCHEMIA, UNSPECIFIED TYPE: ICD-10-CM

## 2017-04-03 DIAGNOSIS — K21.9 GASTROESOPHAGEAL REFLUX DISEASE WITHOUT ESOPHAGITIS: ICD-10-CM

## 2017-04-03 DIAGNOSIS — N39.45 CONTINUOUS LEAKAGE OF URINE: ICD-10-CM

## 2017-04-03 DIAGNOSIS — R91.8 LUNG NODULES: ICD-10-CM

## 2017-04-03 DIAGNOSIS — G30.9 ALZHEIMER'S DEMENTIA WITH BEHAVIORAL DISTURBANCE, UNSPECIFIED TIMING OF DEMENTIA ONSET: Primary | ICD-10-CM

## 2017-04-03 DIAGNOSIS — F32.A DEPRESSION, UNSPECIFIED DEPRESSION TYPE: ICD-10-CM

## 2017-04-03 DIAGNOSIS — I10 ESSENTIAL HYPERTENSION: ICD-10-CM

## 2017-04-03 DIAGNOSIS — M15.9 PRIMARY OSTEOARTHRITIS INVOLVING MULTIPLE JOINTS: ICD-10-CM

## 2017-04-03 DIAGNOSIS — B99.9 CONFUSION ASSOCIATED WITH INFECTION: ICD-10-CM

## 2017-04-03 DIAGNOSIS — Z99.89 OSA ON CPAP: ICD-10-CM

## 2017-04-03 DIAGNOSIS — G47.9 SLEEP DIFFICULTIES: ICD-10-CM

## 2017-04-03 DIAGNOSIS — R73.01 ELEVATED FASTING GLUCOSE: ICD-10-CM

## 2017-04-03 DIAGNOSIS — G25.0 ESSENTIAL TREMOR: ICD-10-CM

## 2017-04-03 DIAGNOSIS — R10.9 RIGHT FLANK PAIN: ICD-10-CM

## 2017-04-03 DIAGNOSIS — G63 POLYNEUROPATHY ASSOCIATED WITH UNDERLYING DISEASE (HCC): ICD-10-CM

## 2017-04-03 DIAGNOSIS — R41.0 CONFUSION: ICD-10-CM

## 2017-04-03 DIAGNOSIS — R10.30 LOWER ABDOMINAL PAIN: Primary | ICD-10-CM

## 2017-04-03 DIAGNOSIS — R41.0 CONFUSION ASSOCIATED WITH INFECTION: ICD-10-CM

## 2017-04-03 DIAGNOSIS — R42 DIZZINESS: ICD-10-CM

## 2017-04-03 DIAGNOSIS — E78.5 HYPERLIPIDEMIA, UNSPECIFIED HYPERLIPIDEMIA TYPE: ICD-10-CM

## 2017-04-03 DIAGNOSIS — G50.0 TRIGEMINAL NEURALGIA: ICD-10-CM

## 2017-04-03 DIAGNOSIS — F02.81 ALZHEIMER'S DEMENTIA WITH BEHAVIORAL DISTURBANCE, UNSPECIFIED TIMING OF DEMENTIA ONSET: Primary | ICD-10-CM

## 2017-04-03 DIAGNOSIS — F01.518 VASCULAR DEMENTIA WITH BEHAVIOR DISTURBANCE: ICD-10-CM

## 2017-04-03 DIAGNOSIS — I67.82 CHRONIC CEREBRAL ISCHEMIA: ICD-10-CM

## 2017-04-03 DIAGNOSIS — G47.33 OSA ON CPAP: ICD-10-CM

## 2017-04-03 LAB
-: ABNORMAL
AMORPHOUS: ABNORMAL
BACTERIA: ABNORMAL
BILIRUBIN URINE: NEGATIVE
BILIRUBIN, POC: NEGATIVE
BLOOD URINE, POC: NEGATIVE
CASTS UA: ABNORMAL /LPF (ref 0–2)
CLARITY, POC: NORMAL
COLOR, POC: NORMAL
COLOR: ABNORMAL
COMMENT UA: ABNORMAL
CRYSTALS, UA: ABNORMAL /HPF
EPITHELIAL CELLS UA: ABNORMAL /HPF (ref 0–5)
GLUCOSE URINE, POC: NEGATIVE
GLUCOSE URINE: NEGATIVE
KETONES, POC: NEGATIVE
KETONES, URINE: NEGATIVE
LEUKOCYTE EST, POC: NEGATIVE
LEUKOCYTE ESTERASE, URINE: NEGATIVE
MUCUS: ABNORMAL
NITRITE, POC: NEGATIVE
NITRITE, URINE: NEGATIVE
OTHER OBSERVATIONS UA: ABNORMAL
PH UA: 7 (ref 5–6)
PH, POC: 7
PROTEIN UA: NEGATIVE
PROTEIN, POC: NEGATIVE
RBC UA: ABNORMAL /HPF (ref 0–4)
RENAL EPITHELIAL, UA: ABNORMAL /HPF
SPECIFIC GRAVITY UA: 1 (ref 1.01–1.02)
SPECIFIC GRAVITY, POC: 1.01
TRICHOMONAS: ABNORMAL
TURBIDITY: ABNORMAL
URINE HGB: NEGATIVE
UROBILINOGEN, POC: 0.2
UROBILINOGEN, URINE: NORMAL
WBC UA: ABNORMAL /HPF (ref 0–4)
YEAST: ABNORMAL

## 2017-04-03 PROCEDURE — 1123F ACP DISCUSS/DSCN MKR DOCD: CPT | Performed by: PSYCHIATRY & NEUROLOGY

## 2017-04-03 PROCEDURE — 99214 OFFICE O/P EST MOD 30 MIN: CPT | Performed by: NURSE PRACTITIONER

## 2017-04-03 PROCEDURE — G8420 CALC BMI NORM PARAMETERS: HCPCS | Performed by: NURSE PRACTITIONER

## 2017-04-03 PROCEDURE — 99215 OFFICE O/P EST HI 40 MIN: CPT | Performed by: PSYCHIATRY & NEUROLOGY

## 2017-04-03 PROCEDURE — G8420 CALC BMI NORM PARAMETERS: HCPCS | Performed by: PSYCHIATRY & NEUROLOGY

## 2017-04-03 PROCEDURE — 1090F PRES/ABSN URINE INCON ASSESS: CPT | Performed by: PSYCHIATRY & NEUROLOGY

## 2017-04-03 PROCEDURE — 1090F PRES/ABSN URINE INCON ASSESS: CPT | Performed by: NURSE PRACTITIONER

## 2017-04-03 PROCEDURE — 81002 URINALYSIS NONAUTO W/O SCOPE: CPT | Performed by: NURSE PRACTITIONER

## 2017-04-03 PROCEDURE — 4040F PNEUMOC VAC/ADMIN/RCVD: CPT | Performed by: NURSE PRACTITIONER

## 2017-04-03 PROCEDURE — 1123F ACP DISCUSS/DSCN MKR DOCD: CPT | Performed by: NURSE PRACTITIONER

## 2017-04-03 PROCEDURE — G8427 DOCREV CUR MEDS BY ELIG CLIN: HCPCS | Performed by: NURSE PRACTITIONER

## 2017-04-03 PROCEDURE — 4040F PNEUMOC VAC/ADMIN/RCVD: CPT | Performed by: PSYCHIATRY & NEUROLOGY

## 2017-04-03 PROCEDURE — G8399 PT W/DXA RESULTS DOCUMENT: HCPCS | Performed by: PSYCHIATRY & NEUROLOGY

## 2017-04-03 PROCEDURE — 1036F TOBACCO NON-USER: CPT | Performed by: PSYCHIATRY & NEUROLOGY

## 2017-04-03 PROCEDURE — G8427 DOCREV CUR MEDS BY ELIG CLIN: HCPCS | Performed by: PSYCHIATRY & NEUROLOGY

## 2017-04-03 PROCEDURE — 0509F URINE INCON PLAN DOCD: CPT | Performed by: PSYCHIATRY & NEUROLOGY

## 2017-04-03 PROCEDURE — G8399 PT W/DXA RESULTS DOCUMENT: HCPCS | Performed by: NURSE PRACTITIONER

## 2017-04-03 PROCEDURE — 1036F TOBACCO NON-USER: CPT | Performed by: NURSE PRACTITIONER

## 2017-04-03 PROCEDURE — 81001 URINALYSIS AUTO W/SCOPE: CPT | Performed by: NURSE PRACTITIONER

## 2017-04-03 RX ORDER — NITROFURANTOIN 25; 75 MG/1; MG/1
100 CAPSULE ORAL 2 TIMES DAILY
Qty: 20 CAPSULE | Refills: 0 | Status: SHIPPED | OUTPATIENT
Start: 2017-04-03 | End: 2017-04-13

## 2017-04-03 ASSESSMENT — ENCOUNTER SYMPTOMS
APNEA: 0
ABDOMINAL DISTENTION: 0
CONSTIPATION: 0
SINUS PRESSURE: 0
BLOOD IN STOOL: 0
PHOTOPHOBIA: 0
DIARRHEA: 0
TROUBLE SWALLOWING: 0
ABDOMINAL PAIN: 0
EYE ITCHING: 0
SORE THROAT: 0
CHOKING: 0
NAUSEA: 0
EYE DISCHARGE: 0
EYE PAIN: 0
SHORTNESS OF BREATH: 0
WHEEZING: 0
COUGH: 0
VOMITING: 0
CHEST TIGHTNESS: 0
COLOR CHANGE: 0
FACIAL SWELLING: 0
VOICE CHANGE: 0
EYE REDNESS: 0
BACK PAIN: 1

## 2017-04-04 ASSESSMENT — ENCOUNTER SYMPTOMS
ABDOMINAL PAIN: 1
BACK PAIN: 1
CONSTIPATION: 1

## 2017-05-17 DIAGNOSIS — Z13.9 SCREENING: Primary | ICD-10-CM

## 2017-06-05 ENCOUNTER — TELEPHONE (OUTPATIENT)
Dept: FAMILY MEDICINE CLINIC | Age: 82
End: 2017-06-05

## 2017-09-11 NOTE — MR AVS SNAPSHOT
After Visit Summary             Monique Reina   2017 1:30 PM   Office Visit    Description:  Female : 3/4/1933   Provider:  Katy Brown MD   Department:  [facility]              Your Follow-Up and Future Appointments         Below is a list of your follow-up and future appointments. This may not be a complete list as you may have made appointments directly with providers that we are not aware of or your providers may have made some for you. Please call your providers to confirm appointments. It is important to keep your appointments. Please bring your current insurance card, photo ID, co-pay, and all medication bottles to your appointment. If self-pay, payment is expected at the time of service. Your To-Do List     Future Appointments Provider Department Dept Phone    2017 2:40 PM Christianne Puente NP [facility] [phone]    Please arrive 15 minutes prior to appointment, bring photo ID and insurance card. 10/5/2017 8:49 PM Liv Rebolledo MD [facility] [phone]    Please arrive 15 minutes prior to appointment, bring photo ID and insurance card. 2017 11:00 AM Robertha Kehr, NP [facility] [phone]    Please arrive 15 minutes prior to appointment time, bring insurance card and photo ID. Follow-Up    Return in about 1 week (around 2017), or if symptoms worsen or fail to improve, for intervention procedure B L3-4 4-5 5-S1 facet steroid injections .          Information from Your Visit        Department     Name Address Phone Fax    [facility] [address] Felice Grantleonel Thompson [phone]      You Were Seen for:         Comments    Chronic lumbar radiculopathy   [[id number]]         Vital Signs     Blood Pressure Pulse Height Weight Body Mass Index Smoking Status    140/80 (Site: Right Arm, Position: Sitting, Cuff Size: Medium Adult) 68 5' 8\" (1.727 m) 187 lb (84.8 kg) 28.43 kg/m2 Never Smoker Additional Information about your Body Mass Index (BMI)           Your BMI as listed above is considered overweight (25.0-29.9). BMI is an estimate of body fat, calculated from your height and weight. The higher your BMI, the greater your risk of heart disease, high blood pressure, type 2 diabetes, stroke, gallstones, arthritis, sleep apnea, and certain cancers. BMI is not perfect. It may overestimate body fat in athletes and people who are more muscular. If your body fat is high you can improve your BMI by decreasing your calorie intake and becoming more physically active. Learn more at: [employer]          Instructions        [facility]  [place]  Telephone [phone]  Fax [phone]      PROCEDURE INSTRUCTIONS FOR  PAIN MANAGEMENT PROCEDURES WITHOUT IV SEDATION    Arielle Morse scheduled to see Dr. Yuri Reyes to undergo the following procedure:  Bilateral L3/L4, L4/L5, L5/S1 Facet Steroid Injections    Procedure Date: ____Friday 9-15-17____  Arrival Time: ____11:30am____     [facility] Registration office on the 1st floor in the hospital, after check in and signing of paper work you will then go to the second floor to the surgery center. 1. Stop the following medications prior to the procedure:    aspirin    · Medication___aspirin ___ held for __3__ days    2. Take all routine medications unless otherwise instructed. Ok to take vitamins and antiinflammatory medications    3. EATING & DRINKING:  Ok to eat or drink before the injection - no IV sedation will be used. 4. If you are allergic to contrast or iodine, you must take benadryl and prednisone prior to the injection to prevent an allergic reaction. Follow the directions on the prescription for the times to take the medication.     5. Oral valium can be prescribed if your are anxious about the injections or feel that you can not lay still during the injection. If you take valium, you must have a . Make arrangements for a family member or friend to drive you to the surgery center. Your ride must stay in the hospital while you are having the injection done. If they cannot stay, the injection will be rescheduled. The valium may affect your judgment following the procedure and driving a vehicle within 24 hours after the sedation could be dangerous. 6.  Wear simple loose clothing, which can be easily changed. 7.  Leave jewelry (including rings) and other valuables at home. 8.  You will be asked to sign several forms prior to surgery; patients under the age of 25 must have a parent or legal guardian sign the permit to be able to do the procedure. 9.  You must have finished any antibiotic prescribed for recent infections. If required, please take pre-procedure antibiotic or other pre-procedure medications as instructed. 10. Bring inhalers and pain medications with you to your procedure. 11. Bring your MRI/CT films if they were done outside of the [facility]. 12. If you should develop a cold, sore throat, cough, fever or other new indication of illness or infection, or are started on antibiotics within 2 weeks of the scheduled procedure, please notify the [facility] office as early as possible at [phone]. Medications and Orders      Your Current Medications Are              HYDROcodone-acetaminophen (NORCO) 5-325 MG per tablet Take 1 tablet by mouth every 4 hours as needed for Pain  Take 1 tablet by mouth every 4 hours as needed for Pain . [facility]  Earliest Fill Date: 7/25/17    sertraline (ZOLOFT) 100 MG tablet TAKE 1 TABLET DAILY    simethicone (MYLICON) 772 MG chewable tablet Take 1 tablet by mouth every 6 hours as needed for Flatulence    oxybutynin (DITROPAN XL) 5 MG extended release tablet Take 1 tablet by mouth daily aspirin 325 MG EC tablet Take 1 tablet by mouth daily    nystatin (MYCOSTATIN) 650072 UNIT/GM powder Apply topically 2 times daily. montelukast (SINGULAIR) 10 MG tablet TAKE 1 TABLET DAILY    Cholecalciferol (VITAMIN D3) 5000 UNITS TABS Take 1 tablet by mouth daily    donepezil (ARICEPT) 10 MG tablet TAKE 1 TABLET DAILY    memantine (NAMENDA) 10 MG tablet TAKE 1 TABLET TWICE A DAY    vitamin E 400 UNIT capsule Take 400 Units by mouth daily. Multiple Vitamins-Minerals (MULTIVITAMIN PO) Take 1 tablet by mouth daily. Naproxen Sodium (ALEVE) 220 MG CAPS Take 2 tablets by mouth daily. magnesium oxide (MAG-OX) 400 MG tablet Take 400 mg by mouth daily.     EPIPEN 2-VIKRAM 0.3 MG/0.3ML SOAJ injection USE AS DIRECTED FOR ALLERGIC REACTION      Allergies              Bee Venom Anaphylaxis    Allergic to bee stings    Codeine     Sulfa Antibiotics          Additional Information        Basic Information     Date Of Birth Sex Race Ethnicity Preferred Language    3/4/1933 Female White Non-/Non  English      Problem List as of 9/11/2017  Date Reviewed: 9/11/2017                Sleep difficulties    Trigeminal neuralgia    Vascular dementia    Confusion    Peripheral neuropathy (HCC)    Dizziness    Memory problem    Chronic cerebral ischemia    TIA (transient ischemic attack)    Chronic lumbar radiculopathy    Sensorineural hearing loss, bilateral    Hyperopia of both eyes with astigmatism and presbyopia    Elevated fasting glucose    Hypertension    Hyperlipidemia    Alzheimer's dementia    Depression    Osteoarthritis    ARIADNA on CPAP    GERD (gastroesophageal reflux disease)    Urinary incontinence    Hyperactivity of bladder    Diverticulosis    Essential tremor      Immunizations as of 9/11/2017     Name Date    Influenza Vaccine, unspecified formulation 10/21/2015    Influenza Virus Vaccine 1/11/2013, 12/12/2011, 10/20/2010    Influenza, High Dose 10/11/2016 For questions regarding your [employer] account call [phone]. If you have a clinical question, please call your doctor's office.

## 2017-09-11 NOTE — PROGRESS NOTES
PAIN MANAGEMENT NEW PATIENT CONSULTATION  9/11/17    Kiesha Martinez  3/4/1933    Referring Provider:  Kaitlin Cazares NP  [address]  [address]    Primary Care Physician:  Kaitlin Cazares NP  [address]  [address] [address]    HPI information obtained from the patient as well as the Comprehensive Pain Management Health Questionnaire filled out by the patient. The questionnaire will be scanned into the electronic chart and PMH, PSH, meds, and allergies will be updates accordingly. Subjective:       CHIEF COMPLAINT:  This is a  80 y.o. female patient who presents with a complaint of Lower Back Pain (pain of 10)             PAIN HPI:    Back Pain   This is a chronic problem. The current episode started more than 1 year ago. The problem occurs daily. The problem has been gradually worsening since onset. The pain is present in the lumbar spine. The quality of the pain is described as aching. The pain does not radiate. The pain is at a severity of 9/10. The pain is moderate. The pain is the same all the time. The symptoms are aggravated by lying down. Stiffness is present all day. Associated symptoms include numbness and weakness. Risk factors include poor posture. She has tried analgesics for the symptoms. The treatment provided mild relief. Prior evaluation:    Previous lower back problems: Yes    Previous workup: Yes   History of surgery in painful area: No    Previous pain medication trials have included:       Opioids: hydrocodone/acetaminophen (Lorcet, Lortab, Norco, Vicodin)     NSAID's:na     Muscle relaxants: na     Neuropathic pain meds: na    Previous Pain Management Physician: No   Nerve blocks, spinal injections: No   Type of Pain Intervention na. Date of last Pain Intervention 2004   Was there pain relief from Pain Intervention: cant remember   How long was your pain relief from the Pain Intervention 1 days.      Sleep:    Sleep disturbance: No   Difficulty falling asleep:  No   Feels fatigued much of the time: No   Wakes up rested: No   Awakens in the middle of the night: No   Takes sleeping medication: No    Mental health:    Patient feels na secondary to their current pain problems as described above. H/O depression and anxiety: No   Patient is not seeing psychologist or psychiatrist   Abuse history? No    Employed? No  Alcohol use?: No  Tobacco use?: No  Marijuana use?: No  Illicit drug use?: No    Imaging: Reviewed available imaging in our system with the patient. No results found. Referring physician records reviewed. Review of Systems   Constitutional: Positive for fatigue. HENT: Negative. Eyes: Negative. Respiratory: Negative. Cardiovascular: Negative. Gastrointestinal: Negative for constipation and diarrhea. Endocrine: Negative. Genitourinary: Negative for difficulty urinating and flank pain. Musculoskeletal: Positive for back pain (worsening lumbar pain B   with transfers) and myalgias. Skin: Negative. Allergic/Immunologic: Negative. Neurological: Positive for weakness and numbness. Hematological: Negative. Psychiatric/Behavioral: Positive for sleep disturbance. Allergies   Allergen Reactions    Bee Venom Anaphylaxis     Allergic to bee stings    Codeine     Sulfa Antibiotics        Outpatient Medications Prior to Visit   Medication Sig Dispense Refill    HYDROcodone-acetaminophen (NORCO) 5-325 MG per tablet Take 1 tablet by mouth every 4 hours as needed for Pain  Take 1 tablet by mouth every 4 hours as needed for Pain . [facility]митр[facility] Leong  Earliest Fill Date: 7/25/17 60 tablet 0    sertraline (ZOLOFT) 100 MG tablet TAKE 1 TABLET DAILY 90 tablet 3    simethicone (MYLICON) 073 MG chewable tablet Take 1 tablet by mouth every 6 hours as needed for Flatulence 30 tablet 0    oxybutynin (DITROPAN XL) 5 MG extended release tablet Take 1 tablet by mouth daily 90 tablet 3    aspirin 325 MG EC tablet Take 1 tablet by mouth daily 30 tablet     nystatin (MYCOSTATIN) 818041 UNIT/GM powder Apply topically 2 times daily. 1 Bottle 2    montelukast (SINGULAIR) 10 MG tablet TAKE 1 TABLET DAILY 90 tablet 3    Cholecalciferol (VITAMIN D3) 5000 UNITS TABS Take 1 tablet by mouth daily      donepezil (ARICEPT) 10 MG tablet TAKE 1 TABLET DAILY 90 tablet 3    memantine (NAMENDA) 10 MG tablet TAKE 1 TABLET TWICE A  tablet 3    vitamin E 400 UNIT capsule Take 400 Units by mouth daily.  Multiple Vitamins-Minerals (MULTIVITAMIN PO) Take 1 tablet by mouth daily.  Naproxen Sodium (ALEVE) 220 MG CAPS Take 2 tablets by mouth daily.  magnesium oxide (MAG-OX) 400 MG tablet Take 400 mg by mouth daily.  EPIPEN 2-VIKRAM 0.3 MG/0.3ML SOAJ injection USE AS DIRECTED FOR ALLERGIC REACTION 2 each 0     No facility-administered medications prior to visit.         Past Medical History:   Diagnosis Date    Allergic rhinitis     Alzheimer's dementia     Arterial ischemic stroke ([facility] [place] [address] 2000 and 2001    Chronic cerebral ischemia     Chronic lumbar radiculopathy     Confusion     Depression     Diverticulosis     Dizziness     Elevated fasting glucose     Essential tremor     Fatigue     GERD (gastroesophageal reflux disease)     Herpes keratitis     left eye    Hyperactivity of bladder     Hyperlipidemia     Left shoulder pain     Memory problem     ARIADNA on CPAP     Osteoarthritis     Peripheral neuropathy (HCC)     Pseudophakia     bilateral    Right leg pain     s/p fall    TIA (transient ischemic attack)     Transient ischemic attack     Trigeminal neuralgia     possible, left side    Urinary incontinence     Vascular dementia        Past Surgical History:   Procedure Laterality Date    BLADDER SUSPENSION  2002    BLADDER SUSPENSION  2003    revision    BREAST BIOPSY Right     benign    CATARACT REMOVAL WITH IMPLANT Bilateral 2009    COLONOSCOPY  2005    COLONOSCOPY  2009    Dr. Serenity Maria COLONOSCOPY  12/11/12 Polyp in transverse colon (adenoma).  COLONOSCOPY  6/11/13    Tiny polyps x2 (adenomatous, hyperplastic). Diverticulosis. [facility] SKIN CANCER EXCISION  June 2010    Basal cell carcinoma removal, Dr. Rizwana Dawson  Nov 2010    Basal cell carcinoma removal, Dr. Clemente Rosa 2003   [place] ENDOSCOPY  2007    with modified barium swallow       Family History   Problem Relation Age of Onset    Heart Failure Mother     Stroke Father     COPD Sister     COPD Sister      Social History     Social History    Marital status:      Spouse name: N/A    Number of children: N/A    Years of education: N/A     Social History Main Topics    Smoking status: Never Smoker    Smokeless tobacco: Never Used    Alcohol use No    Drug use: No    Sexual activity: Not Asked     Other Topics Concern    None     Social History Narrative         Objective:     Physical Exam:  Vitals:    09/11/17 1336   BP: (!) 140/80   Site: Right Arm   Position: Sitting   Cuff Size: Medium Adult   Pulse: 68   Weight: 187 lb (84.8 kg)   Height: 5' 8\" (1.727 m)     Pain Score:  10 - Worst pain ever    Physical Exam   Constitutional: She is oriented to person, place, and time. She appears well-developed and well-nourished. No distress. HENT:   Head: Normocephalic and atraumatic. Right Ear: External ear normal. No decreased hearing is noted. Left Ear: External ear normal. No decreased hearing is noted. Nose: Nose normal.   Mouth/Throat: Oropharynx is clear and moist and mucous membranes are normal.   Eyes: Conjunctivae and lids are normal. No scleral icterus. Neck: Phonation normal.   Cardiovascular:   No BLE edema present   Pulmonary/Chest: Effort normal. No accessory muscle usage. No respiratory distress. Abdominal: Normal appearance.    Genitourinary:   Genitourinary Comments: deferred   Musculoskeletal: She exhibits tenderness ( Kemps B above plan, and coordination of care.

## 2017-09-11 NOTE — PATIENT INSTRUCTIONS
[facility]  [address]  Telephone [phone]  Fax [phone]      PROCEDURE INSTRUCTIONS FOR  PAIN MANAGEMENT PROCEDURES WITHOUT IV SEDATION    Julio Cantrell scheduled to see Dr. Unique Brown to undergo the following procedure:  Bilateral L3/L4, L4/L5, L5/S1 Facet Steroid Injections    Procedure Date: ____Friday 9-15-17____  Arrival Time: ____11:30am____     Report to the [place] [address], Registration office on the 1st floor in the hospital, after check in and signing of paper work you will then go to the second floor to the surgery center. 1. Stop the following medications prior to the procedure:    aspirin    · Medication___aspirin ___ held for __3__ days    2. Take all routine medications unless otherwise instructed. Ok to take vitamins and antiinflammatory medications    3. EATING & DRINKING:  Ok to eat or drink before the injection - no IV sedation will be used. 4. If you are allergic to contrast or iodine, you must take benadryl and prednisone prior to the injection to prevent an allergic reaction. Follow the directions on the prescription for the times to take the medication. 5. Oral valium can be prescribed if your are anxious about the injections or feel that you can not lay still during the injection. If you take valium, you must have a . Make arrangements for a family member or friend to drive you to the surgery center. Your ride must stay in the hospital while you are having the injection done. If they cannot stay, the injection will be rescheduled. The valium may affect your judgment following the procedure and driving a vehicle within 24 hours after the sedation could be dangerous. 6.  Wear simple loose clothing, which can be easily changed. 7.  Leave jewelry (including rings) and other valuables at home.      8.  You will be asked to sign several forms prior to surgery; patients under the age of 25 must have a parent or

## 2017-09-15 PROBLEM — M54.06 PANNICULITIS INVOLVING LUMBAR REGION: Status: ACTIVE | Noted: 2017-01-01

## 2017-09-15 PROBLEM — M47.816 LUMBAR FACET ARTHROPATHY: Status: ACTIVE | Noted: 2017-01-01

## 2017-09-29 PROBLEM — M54.16 LUMBAR RADICULOPATHY: Status: ACTIVE | Noted: 2017-01-01

## 2017-09-29 PROBLEM — G89.29 CHRONIC RIGHT-SIDED LOW BACK PAIN WITH RIGHT-SIDED SCIATICA: Status: ACTIVE | Noted: 2017-01-01

## 2017-09-29 PROBLEM — M47.816 LUMBAR FACET JOINT SYNDROME: Status: ACTIVE | Noted: 2017-01-01

## 2017-09-29 PROBLEM — M48.061 NEURAL FORAMINAL STENOSIS OF LUMBAR SPINE: Status: ACTIVE | Noted: 2017-01-01

## 2017-09-29 PROBLEM — M54.41 CHRONIC RIGHT-SIDED LOW BACK PAIN WITH RIGHT-SIDED SCIATICA: Status: ACTIVE | Noted: 2017-01-01

## 2017-09-29 NOTE — TELEPHONE ENCOUNTER
9/29/17                                                                                                                                                                         To: Jessee Suazo NP   [address] / [place] [address]       From:  Quinton Jeffrey MD   Interventional Pain Mgmt Physician, [facility]    Re: Medication Hiatus for Interventional Pain/Spine Procedure for Monique Reina    Dear Jessee Suazo NP                   I am recommending an injection for Monique Reina to decrease their spine related pain. In order to perform this procedure, antiplatelet/anticoagulant medications will need to be held prior to the procedure. Please respond with one of the options below if you feel that it is safe for Monique Reina to hold this medication and return this letter to me. Monique Reina is taking: ASA    Medications that must be held prior to injections:     Antiplatelets Aspirin, Effient, Plavix,Ticlid,  Brilinta, Savaysa for 3-5 days   Anticoagulant: Coumadin, Eliquis, Lovenox, Heparin, Pradaxa, Xarelto for 5-7 days. All antiplatelets/anticoagulants must be held for 7 days for a spinal cord stimulator. .    Sincerely,       Electronically signed by Christianne Puente NP on 9/29/2017 at 3:10 PM    Quinton Jeffrey MD      ·  I agree with holding this medication for the time described above. ·  I agree with holding this medication for ____ days only. ·  I agree with holding this medication for ____ days, however pt must be on another anticoagulant, _______________.   · I  DO NOT agree with holding this medication

## 2017-10-03 NOTE — INTERVAL H&P NOTE
understands the planned operation and its associated risks and benefits and agrees to proceed.         Electronically signed by Rudolph Paredes MD on 10/3/2017 at 12:19 PM

## 2017-10-03 NOTE — H&P
Expand All Collapse All    Hide copied text  Subjective:       Patient ID: Roseann Pa is a 80 y.o. female. Chief Complaint   Patient presents with    Lower Back Pain       discuss MRI results      HPI Pain has slightly improved since last visit. Here to review MRI results. Pain Assessment  Location of Pain: Back  Location Modifiers: Right (pain radiates into hip on Rt side)  Severity of Pain: 10  Quality of Pain: Sharp  Duration of Pain: Persistent  Frequency of Pain: Constant  Aggravating Factors: Bending, Standing, Walking  Limiting Behavior: Yes  Are there other pain locations you wish to document?: No           Allergies   Allergen Reactions    Bee Venom Anaphylaxis       Allergic to bee stings    Codeine      Sulfa Antibiotics            Active Medications           Outpatient Prescriptions Marked as Taking for the 9/29/17 encounter (Office Visit) with Carlito Rodriguez NP   Medication Sig Dispense Refill    memantine (NAMENDA) 10 MG tablet TAKE 1 TABLET TWICE A  tablet 3    gabapentin (NEURONTIN) 100 MG capsule Gabapentin (Neurontin) Dosing Instructions: 100mg every night for 3 nights, then 100mg every 12 hours for 3 days, then 100mg every 8 hours. 90 capsule 3    cyclobenzaprine (FLEXERIL) 5 MG tablet Take 1 tablet by mouth 3 times daily as needed for Muscle spasms 30 tablet 3    HYDROcodone-acetaminophen (NORCO) 5-325 MG per tablet Take 1 tablet by mouth every 4 hours as needed for Pain  Take 1 tablet by mouth every 4 hours as needed for Pain . Wallace Bejarano  Earliest Fill Date: 7/25/17 60 tablet 0    sertraline (ZOLOFT) 100 MG tablet TAKE 1 TABLET DAILY 90 tablet 3    simethicone (MYLICON) 054 MG chewable tablet Take 1 tablet by mouth every 6 hours as needed for Flatulence 30 tablet 0    oxybutynin (DITROPAN XL) 5 MG extended release tablet Take 1 tablet by mouth daily 90 tablet 3    aspirin 325 MG EC tablet Take 1 tablet by mouth daily 30 tablet      montelukast  NERVE BLOCK LUMB FACET LEVEL 1 BILATERAL Bilateral 9/15/2017     Bilat L3 / L4 L4 / L5 L5/ S1 Facet Inj performed by Bladimir Crowell MD at [address]   June 2010     Basal cell carcinoma removal, Dr. Marilu Queen   Nov 2010     Basal cell carcinoma removal, Dr. Maki Brown Right 2003   [place] ENDOSCOPY   2007     with modified barium swallow             Family History          Family History   Problem Relation Age of Onset    Heart Failure Mother      Stroke Father      COPD Sister      COPD Sister               Social History    Social History            Social History    Marital status:        Spouse name: N/A    Number of children: N/A    Years of education: N/A           Social History Main Topics    Smoking status: Never Smoker    Smokeless tobacco: Never Used    Alcohol use No    Drug use: No    Sexual activity: Not Asked           Other Topics Concern    None      Social History Narrative         Review of Systems   Constitutional: Positive for activity change (decreased). Musculoskeletal: Positive for arthralgias, back pain and myalgias. Neurological:        Radiation to right groin, leg, lower right quad of abdomen   Psychiatric/Behavioral: Positive for sleep disturbance (due to pain). Objective:   Physical Exam   Constitutional: She is oriented to person, place, and time. She appears well-developed and well-nourished. No distress. HENT:   Head: Normocephalic and atraumatic. Right Ear: External ear normal. No decreased hearing is noted. Left Ear: External ear normal. No decreased hearing is noted. Nose: Nose normal.   Mouth/Throat: Oropharynx is clear and moist and mucous membranes are normal.   Eyes: Conjunctivae and lids are normal. No scleral icterus.    Neck: Phonation normal.   Cardiovascular:   No BLE edema present   Pulmonary/Chest: Effort

## 2017-10-03 NOTE — IP AVS SNAPSHOT
Patient Information     Patient Name GIUSEPPE Cespedes 3/4/1933         This is your updated medication list to keep with you all times      TAKE these medications     ALEVE 220 MG Caps   Generic drug:  Naproxen Sodium       aspirin 325 MG EC tablet       cyclobenzaprine 5 MG tablet   Commonly known as:  FLEXERIL   Take 1 tablet by mouth 3 times daily as needed for Muscle spasms       donepezil 10 MG tablet   Commonly known as:  ARICEPT   TAKE 1 TABLET DAILY       EPIPEN 2-VIKRAM 0.3 MG/0.3ML Soaj injection   Generic drug:  EPINEPHrine   USE AS DIRECTED FOR ALLERGIC REACTION       gabapentin 100 MG capsule   Commonly known as:  NEURONTIN   Gabapentin (Neurontin) Dosing Instructions: 100mg every night for 3 nights, then 100mg every 12 hours for 3 days, then 100mg every 8 hours. HYDROcodone-acetaminophen 5-325 MG per tablet   Commonly known as:  NORCO   Take 1 tablet by mouth every 4 hours as needed for Pain  Take 1 tablet by mouth every 4 hours as needed for Pain . Connie Anderson  Earliest Fill Date: 17       magnesium oxide 400 MG tablet   Commonly known as:  MAG-OX       memantine 10 MG tablet   Commonly known as:  NAMENDA   TAKE 1 TABLET TWICE A DAY       montelukast 10 MG tablet   Commonly known as:  SINGULAIR   TAKE 1 TABLET DAILY       MULTIVITAMIN PO       oxybutynin 5 MG extended release tablet   Commonly known as:  DITROPAN XL   Take 1 tablet by mouth daily       sertraline 100 MG tablet   Commonly known as:  ZOLOFT   TAKE 1 TABLET DAILY       simethicone 125 MG chewable tablet   Commonly known as:  MYLICON   Take 1 tablet by mouth every 6 hours as needed for Flatulence       Vitamin D3 5000 units Tabs       vitamin E 400 UNIT capsule

## 2017-10-03 NOTE — IP AVS SNAPSHOT
Patient Information     Patient Name GIUSEPPE Alicia 3/4/1933      Important Information for Stroke      If you have a current diagnosis or history of any of the following, please review the information carefully. STROKE PATIENTS:  If you notice any sign or symptom that indicates that you may be having a stroke, activate the emergency medical services immediately by calling [id number]. These symptoms include: uneven facial expression, arm(s) drifting down, strange speech or loss of speech, vision problems, sudden severe headache, sudden numbness or face/arms/legs, sudden confusion or difficult understanding, sudden dizziness, sudden difficulty with walking. Continue or begin taking the medications prescribed by your physician as listed above. There are personal risk that are associated with [place]. Anyone can have a stroke no matter your age, race or gender. The chances of having a stroke increase if you have certain risk factors. The best way to protect yourself and loved ones from stroke is to understand your personal risk and how to manage it. There are 2 types of risk factors for stroke: uncontrollable and controllable. Uncontrollable risk factors include being over age 54, being male, being ,  or /, or having a personal or family history of a stroke or transient ischemic attack (TIA). Controllable risk factors generally fall into two categories: lifestyle risk factors or medical risk factors. Lifestyle risk factors can often be changed, while medical risk factors can usually be treated. Both types can be managed best by working with a doctor, who can prescribe medications and advise on how to adopt a healthy lifestyle. Controllable risk factors include high blood pressure, atrial fibrillation, high cholesterol, diabetes, atherosclerosis, circulation problems, tobacco use or smoking, alcohol use, lack of exercise, and obesity. ___  Statin prescribed  or ___ Not applicable

## 2017-10-03 NOTE — IP AVS SNAPSHOT
After Visit Summary  (Discharge Instructions)    Medication List for Home    Based on the information you provided to us as well as any changes during this visit, the following is your updated medication list.  Compare this with your prescription bottles at home. If you have any questions or concerns, contact your primary care physician's office. Daily Medication List (This medication list can be shared with any healthcare provider who is helping you manage your medications)      These are medications you told us you were taking at home, CONTINUE taking them after you leave the hospital        Last Dose    Next Dose Due AM NOON PM NIGHT    ALEVE 220 MG Caps   Generic drug:  Naproxen Sodium   Take 2 tablets by mouth daily. aspirin 325 MG EC tablet   Take 1 tablet by mouth daily                                         cyclobenzaprine 5 MG tablet   Commonly known as:  FLEXERIL   Take 1 tablet by mouth 3 times daily as needed for Muscle spasms                                         donepezil 10 MG tablet   Commonly known as:  ARICEPT   TAKE 1 TABLET DAILY                                         EPIPEN 2-VIKRAM 0.3 MG/0.3ML Soaj injection   Generic drug:  EPINEPHrine   USE AS DIRECTED FOR ALLERGIC REACTION                                         gabapentin 100 MG capsule   Commonly known as:  NEURONTIN   Gabapentin (Neurontin) Dosing Instructions: 100mg every night for 3 nights, then 100mg every 12 hours for 3 days, then 100mg every 8 hours. HYDROcodone-acetaminophen 5-325 MG per tablet   Commonly known as:  NORCO   Take 1 tablet by mouth every 4 hours as needed for Pain  Take 1 tablet by mouth every 4 hours as needed for Pain . Connie Anderson Earliest Fill Date: 7/25/17                                         magnesium oxide 400 MG tablet   Commonly known as:  MAG-OX   Take 400 mg by mouth daily. memantine 10 MG tablet   Commonly known as:  NAMENDA   TAKE 1 TABLET TWICE A DAY                                         montelukast 10 MG tablet   Commonly known as:  SINGULAIR   TAKE 1 TABLET DAILY                                         MULTIVITAMIN PO   Take 1 tablet by mouth daily. oxybutynin 5 MG extended release tablet   Commonly known as:  DITROPAN XL   Take 1 tablet by mouth daily                                         sertraline 100 MG tablet   Commonly known as:  ZOLOFT   TAKE 1 TABLET DAILY                                         simethicone 125 MG chewable tablet   Commonly known as:  MYLICON   Take 1 tablet by mouth every 6 hours as needed for Flatulence                                         Vitamin D3 5000 units Tabs   Take 1 tablet by mouth daily                                         vitamin E 400 UNIT capsule   Take 400 Units by mouth daily. Allergies as of 10/3/2017        Reactions    Bee Venom Anaphylaxis    Allergic to bee stings    Codeine     Sulfa Antibiotics       Immunizations as of 10/3/2017     Name Date Dose VIS Date Route    Influenza Vaccine, unspecified formulation 10/21/2015 -- -- --    Comment: uploaded via claim file    Influenza Virus Vaccine 1/11/2013 -- -- --    Influenza Virus Vaccine 12/12/2011 -- -- --    Influenza Virus Vaccine 10/20/2010 -- -- --    Influenza, High Dose 10/11/2016 0.5 mL 8/7/2015 Intramuscular    Pneumococcal Conjugate 7-valent 9/24/2006 -- -- --    Pneumococcal Polysaccharide ([id number]) 4/24/2006 -- -- --    Td 10/21/2004 -- -- --      Last Vitals          Most Recent Value    Temp  97.9 °F (36.6 °C)    Pulse  76    Resp  16    BP  (!)  146/81         After Visit Summary    This summary was created for you. Thank you for entrusting your care to us.   The following information includes details about your hospital/visit stay along with steps you should take to help with your recovery once you leave the hospital.  In this packet, you will find information about the topics listed below:    · Instructions about your medications including a list of your home medications  · A summary of your hospital visit  · Follow-up appointments once you have left the hospital  · Your care plan at home      You may receive a survey regarding the care you received during your stay. Your input is valuable to us. We encourage you to complete and return your survey in the envelope provided. We hope you will choose us in the future for your healthcare needs. Patient Information     Patient Name GIUSEPPE Calhoun 3/4/1933      Care Provided at:     Name Address Phone       [facility] [address] [place] [phone]            Your Visit    Here you will find information about your visit, including the reason for your visit. Please take this sheet with you when you visit your doctor or other health care provider in the future. It will help determine the best possible medical care for you at that time. If you have any questions once you leave the hospital, please call the department phone number listed below. Why you were here     Your primary diagnosis was:  Chronic Lumbar Radiculopathy    Your diagnoses also included:  Neural Foraminal Stenosis Of Lumbar Spine      Visit Information     Date & Time Provider Department Dept. Phone    10/3/2017 Armond Howard MD [address] [facility] [place] [facility] [place]  [phone]       Follow-up Appointments    Below is a list of your follow-up and future appointments. This may not be a complete list as you may have made appointments directly with providers that we are not aware of or your providers may have made some for you. Please call your providers to confirm appointments. It is important to keep your appointments.  Please bring your current insurance card, photo ID, co-pay, and all medication bottles to your appointment. If self-pay, payment is expected at the time of service. Follow-up Information     Follow up with Inessa Langston NP. Specialty:  Nurse Practitioner    Why:  As scheduled    Contact information:    [address] [place]  [phone]        Future Appointments     10/5/2017 2:30 PM     Appointment with Chantal Blount MD at [facility] ([phone])   Please arrive 15 minutes prior to appointment, bring photo ID and insurance card. [facility]       10/17/2017 11:30 AM     Appointment with Inessa Langston NP at [facility] ([phone])   Please arrive 15 minutes prior to appointment, bring photo ID and insurance card. [address]       11/21/2017 11:00 AM     Appointment with Zayra Gant NP at [place] [address] ([phone])   Please arrive 15 minutes prior to appointment time, bring insurance card and photo ID.    [facility]         Preventive Care        Date Due    Zoster Vaccine 3/4/1993    Tetanus Combination Vaccine (1 - Tdap) 10/22/2004    Pneumococcal Vaccines (two) for all adults aged 72 and over (2 of 2 - PCV13) 2/25/2016    Yearly Flu Vaccine (1) 9/1/2017    Colonoscopy 6/11/2018                 Care Plan Once You Return Home    This section includes instructions you will need to follow once you leave the hospital.  Your care team will discuss these with you, so you and those caring for you know how to best care for your health needs at home. This section may also include educational information about certain health topics that may be of help to you.           Discharge Instructions             Home Care after Transforaminal Epidural Steroid Injection/Nerve Block    The doctor has done an injection in your neck; upper back; or lower back to       decrease pain and inflammation. This may help diagnose the source of your pain. You may feel sore at the injection site for the next 2-4 days. You may apply ice to the site for 20 minutes on and 20 minutes off to decrease pain and discomfort, if needed, for the first 24 hours. After 24 hours, you may use heat if needed. Remove the band-aid in 24 hours from the injection site. Your pain may subside right away, or it may take a number of days. This is because two medicines were used in the injection. The first, a local anesthetic, will only work for a few hours. The second, a steroid, may not start working for 2-5 days. Some patients have noticed no changes in their pain for up to 2 weeks. There may be a time after the local anesthetic wears off that you feel like you have more pain. This is called pain flare. If this happens  ? Limit your activities for the first 24 hours to those that you can do without pain. ? Keep on taking your pain medicine as prescribed. Sometimes, some patients have had facial and neck flushing, anxiety or nervousness, and mood swings with the use of steroids. These symptoms most often occur within the first 24-48 hours and do not require any treatment. They should go away on their own within one week. If you have diabetes, steroids will cause your blood sugar to increase. Make sure your primary doctor is aware of this and that you have orders to treat your blood sugar to keep it within your normal range. You may have some weakness for the next 3-5 hours due to the anesthetic used. Take it easy. No baths or soaking the injection site for 24 hours after the procedure. Taking a shower is okay. You may resume taking your routine medicines after the procedure including pain medicines as prescribed.  Resume any medications held for the procedure (blood thinners, aspirin, anti-inflammatories)

## 2017-10-03 NOTE — OP NOTE
TRANSFORAMINAL EPIDURAL STEROID INJECTION    10/3/17    Surgeon: Shanon Bonilla MD    Pre-operative Diagnosis:   Patient Active Problem List   Diagnosis Code    Elevated fasting glucose R73.01    Hypertension I10    Hyperlipidemia E78.5    Alzheimer's dementia G30.9    Depression F32.9    Osteoarthritis M19.90    ARIADNA on CPAP G47.33, Z99.89    GERD (gastroesophageal reflux disease) K21.9    Urinary incontinence R32    Hyperactivity of bladder N31.8    Diverticulosis K57.90    Essential tremor G25.0    Hyperopia of both eyes with astigmatism and presbyopia H52.03, H52.203, H52.4    Sensorineural hearing loss, bilateral H90.3    Trigeminal neuralgia G50.0    Vascular dementia F01.50    Confusion R41.0    Peripheral neuropathy ([address] G62.9    Dizziness R42    Memory problem R41.3    Chronic cerebral ischemia I67.82    TIA (transient ischemic attack) G45.9    Chronic lumbar radiculopathy M54.16    Sleep difficulties G47.9    Panniculitis involving lumbar region M54.06    Lumbar facet arthropathy (HCC) M12.88    Lumbar radiculopathy M54.16    Chronic right-sided low back pain with right-sided sciatica M54.41, G89.29    Neural foraminal stenosis of lumbar spine M99.83    Lumbar facet joint syndrome M12.88       Post-operative Diagnosis: Same    Assistants: none    This is a 80 y.o. female patient with pain in the Back, right leg.  Previous treatment and examination findings are noted in the H&P.RL4,L5 transforaminal epidural injection has been requested for diagnostic and therapeutic reasons. Conservative treatment was ineffective i.e.: ice, NSAIDS, rest, narcotic medication, chiropractic care, physical therapy and message therapy. Patient is unable to perform the following ADL's: ambulating and grooming     Pain Assessment: 0-10  Pain Level: 8     Pain Orientation: Lower  Pain Location: Back  Pain Descriptors: Constant    Last Plain films: 9/27/17      EXAMINATION:  1.  RL4,5 .5 mL of Omnipaque 240 contrast medium opacified the spinal nerve and demonstrated contiguous flow into the R L5 epidural space. No vascular spread was noted. Digital subtraction was not employed to evaluate for vascular spread. The patient was monitored for any untoward reaction to contrast medium before proceeding with procedure #2. The patient did not report pain reproduction in a concordant distribution. Following needle position verification, a test dose of 1 mL of sterile saline was administered and patient monitored for any adverse effects. Then, .5 mL of Dexamethasone (Decadron 10 mg/mL)   was instilled into the epidural space and the patient's response was again monitored. Finally,  1 ml of 0.5% bupivacaine was then instilled. The patient's response was again monitored. The spinal needle was removed. The patient tolerated the procedure well and without complications and was noted to be in stable condition prior to discharge from the procedure center with discharge instructions. EBL: no blood loss    SPECIMEN: none    IMPRESSIONS:  1. RL4,5 transforaminal epidurogram, epidural anesthesia and epidural steroid injection procedures accomplished without incident. RECOMMENDATIONS:  1. Complete and return Post-Procedure Pain and Activity Diary.   2. Contact the [address] for symptom exacerbation, fever or unusual symptoms. 3. Post-procedure care according to verbal and written discharge instructions    POST-PROCEDURE EPIDUROGRAPHY INTERPRETATION:    EXAMINATION: AP, lateral, and oblique views    FLUORO TIME: 18 seconds    DISCUSSION: Spot views of the spine reveal normal alignment and segmentation. Spinal needle is positioned at the RL4,5 neuroforamin. Contrast spreads and outlines the RL4,5 nerve/ neuroforamin and epidural space. The epidurogram reveals excellent contrast flow. Visualized spine reveals See radiology report. Soft tissues reveal no abnormalities.     IMPRESSION:

## 2017-10-05 NOTE — MR AVS SNAPSHOT
After Visit Summary             Rubio Rowe   10/5/2017 2:30 PM   Office Visit    Description:  Female : 3/4/1933   Provider:  Misbah Pascual MD   Department:  [facility]              Your Follow-Up and Future Appointments         Below is a list of your follow-up and future appointments. This may not be a complete list as you may have made appointments directly with providers that we are not aware of or your providers may have made some for you. Please call your providers to confirm appointments. It is important to keep your appointments. Please bring your current insurance card, photo ID, co-pay, and all medication bottles to your appointment. If self-pay, payment is expected at the time of service. Your To-Do List     Future Appointments Provider Department Dept Phone    10/17/2017 11:30 AM Alisa Garza NP [facility] [phone]    Please arrive 15 minutes prior to appointment, bring photo ID and insurance card. 2017 11:00 AM [facility] [phone]    Please arrive 15 minutes prior to appointment time, bring insurance card and photo ID.     2018 2:61 PM Misbah Pascual MD [facility] [phone]    Please arrive 15 minutes prior to appointment, bring photo ID and insurance card. Information from Your Visit        Department     Name Address Phone Fax    [facility] [address] [place] [phone]      You Were Seen for:         Comments    Dysthymia   [[id number]]         Vital Signs     Blood Pressure Pulse Height Weight Oxygen Saturation Body Mass Index    118/66 (Site: Left Arm, Position: Sitting, Cuff Size: Medium Adult) 98 5' 8.11\" (1.73 m) 184 lb 1.4 oz (83.5 kg) 98% 27.9 kg/m2    Smoking Status                   Never Smoker           Additional Information about your Body Mass Index (BMI)           Your BMI as listed above is considered overweight (25.0-29.9).  BMI is an Influenza Vaccine, unspecified formulation 10/21/2015    Influenza Virus Vaccine 1/11/2013, 12/12/2011, 10/20/2010    Influenza, High Dose 10/11/2016    Pneumococcal Conjugate 7-valent 9/24/2006    Pneumococcal Polysaccharide ([id number]) 4/24/2006    Td 10/21/2004      Preventive Care        Date Due    Zoster Vaccine 3/4/1993    Tetanus Combination Vaccine (1 - Tdap) 10/22/2004    Pneumococcal Vaccines (two) for all adults aged 72 and over (2 of 2 - PCV13) 2/25/2016    Yearly Flu Vaccine (1) 9/1/2017    Colonoscopy 6/11/2018            MyChart Signup           [employer] allows you to send messages to your doctor, view your test results, renew your prescriptions, schedule appointments, view visit notes, and more. How Do I Sign Up? 1. In your Internet browser, go to [email or web address]  2. Click on the Sign Up Now link in the Sign In box. You will see the New Member Sign Up page. 3. Enter your [employer] Access Code exactly as it appears below. You will not need to use this code after youve completed the sign-up process. If you do not sign up before the expiration date, you must request a new code. [employer] Access Code: [email or web address]  Expires: 11/21/2017  4:59 AM    4. Enter your Social Security Number ([id number]) and Date of Birth (mm/dd/yyyy) as indicated and click Submit. You will be taken to the next sign-up page. 5. Create a [employer] ID. This will be your [employer] login ID and cannot be changed, so think of one that is secure and easy to remember. 6. Create a [employer] password. You can change your password at any time. 7. Enter your Password Reset Question and Answer. This can be used at a later time if you forget your password. 8. Enter your e-mail address. You will receive e-mail notification when new information is available in [address] 9. Click Sign Up. You can now view your medical record.      Additional Information If you have questions, please contact the physician practice where you receive care. Remember, MyChart is NOT to be used for urgent needs. For medical emergencies, dial 911. For questions regarding your MyChart account call [phone]. If you have a clinical question, please call your doctor's office.

## 2017-10-05 NOTE — PROGRESS NOTES
Subjective:      Patient ID: Michelle Quiñones is a 80 y. o. RIGHT   HANDED  female. Neurologic Problem   The patient's primary symptoms include clumsiness, a loss of balance and memory loss. The patient's pertinent negatives include no focal sensory loss, focal weakness, near-syncope, slurred speech, syncope, visual change or weakness. This is a chronic problem. The current episode started more than 1 year ago. The neurological problem developed insidiously. The problem has been gradually worsening since onset. There was no focality noted. Associated symptoms include back pain, confusion and dizziness. Pertinent negatives include no abdominal pain, auditory change, aura, bladder incontinence, bowel incontinence, chest pain, diaphoresis, fatigue, fever, headaches, light-headedness, nausea, neck pain, palpitations, shortness of breath, vertigo or vomiting. Treatments tried: [address] [place] [address]. The treatment provided mild relief. Her past medical history is significant for a CVA, dementia and mood changes. There is no history of a bleeding disorder, a clotting disorder, head trauma, liver disease or seizures. History obtained from  The patient and daughter   and other  available medical records were  Also  reviewed.       1)   H/O   SEVERE  PROGRESSIVE  DEMENTIA  OF  ALZHEIMER    FOR  MORE  THAN   5  YEARS    2)  H/O   INTERMITTENT  BRIEF  CONFUSIONAL  EPISODES                  H/O  BRIEF  BEHAVIORAL PROBLEMS                       MORE  SO  DURING  THE NIGHT  TIME    3)   PATIENT  ALREADY  ON  ARICEPT  AND  NAMENDA   FOR  MORE  THAN   3  YEARS    4)  H/O   OLD  CVA    5)  RECURRENT  TIA    7   TIMES  IN  THE  PAST             ON   ASA  PRO PHYLACTICALLY    6)  H/O   CHRONIC  DEPRESSION                    ON   ZOLOFT   -   STABLE    7)  H/O  SLEEP  DIFFICULTIES   AND   REM  STAGE  BEHAVIORAL  DISORDER           NO   H/O  NOCTURNAL  SEIZURES    8)   PATIENT  LIVES  WITH  DAUGHTER   WHO  IS  ALSO  HER   CARE GIVER    9)    H/O   CHRONIC  LUMBAR  PAIN  AND  RADICULOPATHY    10)  PATIENT  BEING  FOLLOWED  BY  PAIN  MANAGEMENT               PATIENT  ON   NORCO  AND  NEURONTIN   ,    WHICH  CAN  WORSEN  HER  COGNITIVE  PROBLEMS                  PATIENT'S  DAUGHTER  IS  AWARE  OF  THE  SAME. 11)  H/O   MILD  ESSENTIAL TREMOR   -   STABLE    12)  MULTIPLE  CO  MORBID MEDICAL  CONDITIONS    13)  PROGRESSIVE  COGNITIVE  DECLINE  SINCE  July 2017   AS  PER  HER  DAUGHTER. CONCERN  FOR  NEW  STROKE,  SEIZURE  AND  OTHER  ETIOLOGIES       14)   CURRENTLY  PATIENT'S  DAUGHTER   WANTS  TO  WAIT                    FOR  ANY  NEURO  DIAGNOSTIC  EVALUATIONS  AND  TESTING          The  Duration,  Quality,  Severity,  Location,  Timing,  Context,  Modifying  Factors   Of   The   Chief   Complaint   And  Present  Illness   Was   Reviewed   In   Chronological   [place].                                                 Patient   Indicates   The  Presence   And  The  Absence  Of  The  Following  Associated  And   Additional  Neurological    Symptoms:                                Balance  And coordination problems  present           Gait problems     absent            Headaches      absent              Migraines           absent           Memory problems        present           Confusion        present            Paresthesia numbness          absent           Seizures  And  Starring  Episodes           absent           Syncope,  Near  syncopal episodes         absent           Speech problems           absent             Swallowing  Problems      absent            Dizziness,  Light headedness           present                        Vertigo        absent             Generalized   Weakness    absent              focal  Weakness     absent             Tremors         present              Sleep  Problems     absent             History  Of   Recent   Head  Injury     absent             History  Of   Recent  TIA     absent Past Surgical History:   Procedure Laterality Date    BLADDER SUSPENSION  2002    BLADDER SUSPENSION  2003    revision    BREAST BIOPSY Right     benign    CATARACT REMOVAL WITH IMPLANT Bilateral 2009    COLONOSCOPY  2005    COLONOSCOPY  2009    Dr. Robina Stephens    COLONOSCOPY  12/11/12    Polyp in transverse colon (adenoma).  COLONOSCOPY  6/11/13    Tiny polyps x2 (adenomatous, hyperplastic). Diverticulosis.  LUMBAR SPINE SURGERY Right 10/3/2017    Right L4 & L5 TRANSFORAMINAL performed by Mindy Abdul MD at [address] FACET LEVEL 1 BILATERAL Bilateral 9/15/2017    Bilat L3 / L4 L4 / L5 L5/ S1 Facet Inj performed by Mindy Abdul MD at [address]  June 2010    Basal cell carcinoma removal, Dr. Hay Splinter  Nov 2010    Basal cell carcinoma removal, Dr. Frank Pascual Right 2003   [place] ENDOSCOPY  2007    with modified barium swallow                 Current Outpatient Prescriptions   Medication Sig Dispense Refill    memantine (NAMENDA) 10 MG tablet TAKE 1 TABLET TWICE A  tablet 3    gabapentin (NEURONTIN) 100 MG capsule Gabapentin (Neurontin) Dosing Instructions: 100mg every night for 3 nights, then 100mg every 12 hours for 3 days, then 100mg every 8 hours. 90 capsule 3    cyclobenzaprine (FLEXERIL) 5 MG tablet Take 1 tablet by mouth 3 times daily as needed for Muscle spasms 30 tablet 3    HYDROcodone-acetaminophen (NORCO) 5-325 MG per tablet Take 1 tablet by mouth every 4 hours as needed for Pain  Take 1 tablet by mouth every 4 hours as needed for Pain . Iwona Pretpriti  Earliest Fill Date: 7/25/17 60 tablet 0    sertraline (ZOLOFT) 100 MG tablet TAKE 1 TABLET DAILY 90 tablet 3    simethicone (MYLICON) 164 MG chewable tablet Take 1 tablet by mouth every 6 hours as needed for Flatulence 30 tablet 0    oxybutynin (DITROPAN XL) 5 MG extended release tablet Take 1 tablet by mouth daily 90 tablet 3    aspirin 325 MG EC tablet Take 1 tablet by mouth daily 30 tablet     montelukast (SINGULAIR) 10 MG tablet TAKE 1 TABLET DAILY 90 tablet 3    Cholecalciferol (VITAMIN D3) 5000 UNITS TABS Take 1 tablet by mouth daily      donepezil (ARICEPT) 10 MG tablet TAKE 1 TABLET DAILY 90 tablet 3    vitamin E 400 UNIT capsule Take 400 Units by mouth daily.  Multiple Vitamins-Minerals (MULTIVITAMIN PO) Take 1 tablet by mouth daily.  Naproxen Sodium (ALEVE) 220 MG CAPS Take 2 tablets by mouth daily.  magnesium oxide (MAG-OX) 400 MG tablet Take 400 mg by mouth daily.  EPIPEN 2-VIKRAM 0.3 MG/0.3ML SOAJ injection USE AS DIRECTED FOR ALLERGIC REACTION 2 each 0     No current facility-administered medications for this visit. Allergies   Allergen Reactions    Bee Venom Anaphylaxis     Allergic to bee stings    Codeine     Sulfa Antibiotics                Family History   Problem Relation Age of Onset    Heart Failure Mother     Stroke Father     COPD Sister     COPD Sister              Social History     Social History    Marital status:      Spouse name: N/A    Number of children: N/A    Years of education: N/A     Occupational History    Not on file.      Social History Main Topics    Smoking status: Never Smoker    Smokeless tobacco: Never Used    Alcohol use No    Drug use: No    Sexual activity: Not on file     Other Topics Concern    Not on file     Social History Narrative       Vitals:    10/05/17 1409   BP: 118/66   Pulse: 98   SpO2: 98%         Wt Readings from Last 3 Encounters:   10/05/17 184 lb 1.4 oz (83.5 kg)   10/03/17 184 lb (83.5 kg)   09/29/17 183 lb 12.8 oz (83.4 kg)         BP Readings from Last 3 Encounters:   10/05/17 118/66   10/03/17 (!) 146/81   09/29/17 130/82       Hematology and Coagulation  Lab Results   Component Value Date    WBC 5.0 09/18/2017    RBC 4.07 09/18/2017    HGB 12.7 09/18/2017    HCT 38.0 rapid and/or pressured, not tangential and not slurred. She is slowed. She is not agitated, not aggressive, not hyperactive, not withdrawn, not actively hallucinating and not combative. Thought content is not paranoid and not delusional. Cognition and memory are impaired. She does not express impulsivity or inappropriate judgment. She exhibits a depressed mood. She expresses no homicidal and no suicidal ideation. She expresses no suicidal plans and no homicidal plans. She is communicative. She exhibits abnormal recent memory. She exhibits normal remote memory. She is attentive. NEUROLOGICAL EXAMINATION :    A) MENTAL STATUS:                   Alert and  oriented  To time, place  And  Person. No Aphasia. No  Dysarthria. Able   To  Follow  SIMPLE  Step commands. No right  To left confusion. SLOW  Speech  And language function. Insight and  Judgment ,Fund  Of  Knowledge   decreased              Recent  And  Remote memory  decreased              Attention &Concentration are decreased                                                 B) CRANIAL NERVES :             2 CN : Visual  Acuity  And  Visual fields  within normal limits                      Fundi  Could  Not  Be  Could  Not  Be  Evaluated. 3,4,6 CN : Both  Pupils are   Reactive and  Equal.                          Extraocular   Movements  Are  Intact. No  Nystagmus. No  JULIAN. No  Afferent  Pupillary  Defect noted. 5 CN :  Normal  Facial sensations and Corneal  Reflexes         7 CN :  Normal  Facial  Symmetry  And  Strength. No facial  Weakness.          8 CN :  Hearing  Appears decreased        9, 10 CN: Normal spontaneous, reflex palate movements       11 CN:   Normal  Shoulder shrug and  strength       12 CN :   Normal  Tongue movements and  Tongue  In midline                      No tongue   Fasciculations or atrophy     C) MOTOR  EXAM: alignment of the spine except for moderate   dextroconvex scoliosis. The vertebral body heights are maintained. The bone   marrow signal appears unremarkable.       SPINAL CORD: The conus terminates normally.       SOFT TISSUES: No paraspinal mass identified.       L1-L2: There is mild narrowing of the inferior neural foramina bilaterally   due to hypertrophic facet disease.  The central canal is patent.       L2-L3: There is a moderate circumferential annular bulge and severe facet   hypertrophy causing moderately severe narrowing of the neural foramina, right   greater than left.  The thecal sac is patent.       L3-L4: There is a moderate circumferential bulge and facet hypertrophy   causing moderately severe narrowing of the neural foramina bilaterally.  The   thecal sac is patent.       L4-L5: There is a moderate circumferential annular bulge and moderate   posterior central disc protrusion with severe hypertrophic facet disease   causing together severe central and lateral trefoil type spinal stenosis. The thecal sac measures 5-6 mm in the midline.       L5-S1: There is a moderate posterior broad-based disc protrusion and severe   hypertrophic facet disease causing severe narrowing of the neural foramina,   left greater than right.  The central thecal sac measures 8 mm in the midline.           Impression   Multilevel degenerative disc disease, scoliosis, and spondylosis, in   particular, at L4-5 and L5-S1, where there is severe central and lateral   spinal stenosis.               Plan:      VISITING DIAGNOSIS:      ICD-10-CM ICD-9-CM    1. Late onset Alzheimer's disease with behavioral disturbance G30.1 331.0     F02.81 294.11    2. Dysthymia F34.1 300.4    3. Lumbar radiculopathy M54.16 724.4    4. Chronic right-sided low back pain with right-sided sciatica M54.41 724.2     G89.29 724.3      338.29    5. Lumbar facet arthropathy (HCC) M12.88 721.3    6. Confusion R41.0 298.9    7.  Chronic cerebral ischemia Neurology &  In  [facility] ([facility])      DISCLAIMER:   Although every effort was made to ensure the accuracy of this  electronic transcription, some errors in transcription may have occurred. GENERAL PATIENT INSTRUCTIONS:     A Healthy Lifestyle: Care Instructions  Your Care Instructions  A healthy lifestyle can help you feel good, stay at a healthy weight, and have plenty of energy for both work and play. A healthy lifestyle is something you can share with your whole family. A healthy lifestyle also can lower your risk for serious health problems, such as high blood pressure, heart disease, and diabetes. You can follow a few steps listed below to improve your health and the health of your family. Follow-up care is a key part of your treatment and safety. Be sure to make and go to all appointments, and call your doctor if you are having problems. Its also a good idea to know your test results and keep a list of the medicines you take. How can you care for yourself at home? Do not eat too much sugar, fat, or fast foods. You can still have dessert and treats now and then. The goal is moderation. Start small to improve your eating habits. Pay attention to portion sizes, drink less juice and soda pop, and eat more fruits and vegetables. Eat a healthy amount of food. A 3-ounce serving of meat, for example, is about the size of a deck of cards. Fill the rest of your plate with vegetables and whole grains. Limit the amount of soda and sports drinks you have every day. Drink more water when you are thirsty. Eat at least 5 servings of fruits and vegetables every day. It may seem like a lot, but it is not hard to reach this goal. A serving or helping is 1 piece of fruit, 1 cup of vegetables, or 2 cups of leafy, raw vegetables. Have an apple or some carrot sticks as an afternoon snack instead of a candy bar.  Try to have fruits and/or vegetables at every meal.  Make exercise part of your daily routine. You may want to start with simple activities, such as walking, bicycling, or slow swimming. Try to be active 30 to 60 minutes every day. You do not need to do all 30 to 60 minutes all at once. For example, you can exercise 3 times a day for 10 or 20 minutes. Moderate exercise is safe for most people, but it is always a good idea to talk to your doctor before starting an exercise program.  Keep moving. Mayme Santos the lawn, work in the garden, or [employer]. Take the stairs instead of the elevator at work. If you smoke, quit. People who smoke have an increased risk for heart attack, stroke, cancer, and other lung illnesses. Quitting is hard, but there are ways to boost your chance of quitting tobacco for good. Use nicotine gum, patches, or lozenges. Ask your doctor about stop-smoking programs and medicines. Keep trying. In addition to reducing your risk of diseases in the future, you will notice some benefits soon after you stop using tobacco. If you have shortness of breath or asthma symptoms, they will likely get better within a few weeks after you quit. Limit how much alcohol you drink. Moderate amounts of alcohol (up to 2 drinks a day for men, 1 drink a day for women) are okay. But drinking too much can lead to liver problems, high blood pressure, and other health problems. Family health  If you have a family, there are many things you can do together to improve your health. Eat meals together as a family as often as possible. Eat healthy foods. This includes fruits, vegetables, lean meats and dairy, and whole grains. Include your family in your fitness plan. Most people think of activities such as jogging or tennis as the way to fitness, but there are many ways you and your family can be more active. Anything that makes you breathe hard and gets your heart pumping is exercise.  Here are some tips:  Walk to do errands or to take your child to school or the bus.  Go for a family bike ride after dinner instead of watching TV. Where can you learn more? Go to https://chpepiceweb.[employer] org and sign in to your [employer] account. Enter V011 in the [employer] box to learn more about \"A Healthy Lifestyle: Care Instructions. \"     If you do not have an account, please click on the \"Sign Up Now\" link. Current as of: July 26, 2016  Content Version: 11.2  © 8973-8329 [employer]. Care instructions adapted under license by [facility] ([facility]). If you have questions about a medical condition or this instruction, always ask your healthcare professional. [address] any warranty or liability for your use of this information.

## 2017-10-24 NOTE — PROGRESS NOTES
Take 1 tablet by mouth daily      donepezil (ARICEPT) 10 MG tablet TAKE 1 TABLET DAILY 90 tablet 3    vitamin E 400 UNIT capsule Take 400 Units by mouth daily.  Multiple Vitamins-Minerals (MULTIVITAMIN PO) Take 1 tablet by mouth daily.  Naproxen Sodium (ALEVE) 220 MG CAPS Take 2 tablets by mouth daily.  magnesium oxide (MAG-OX) 400 MG tablet Take 400 mg by mouth daily.  EPIPEN 2-VIKRAM 0.3 MG/0.3ML SOAJ injection USE AS DIRECTED FOR ALLERGIC REACTION 2 each 0       Past Medical History:   Diagnosis Date    Allergic rhinitis     Alzheimer's dementia     Arterial ischemic stroke ([facility] [address] 2000 and 2001    Chronic cerebral ischemia     Chronic lumbar radiculopathy     Confusion     Depression     Diverticulosis     Dizziness     Elevated fasting glucose     Essential tremor     Fatigue     GERD (gastroesophageal reflux disease)     Herpes keratitis     left eye    Hyperactivity of bladder     Hyperlipidemia     Left shoulder pain     Memory problem     ARIADNA on CPAP     Osteoarthritis     Peripheral neuropathy (HCC)     Pseudophakia     bilateral    Right leg pain     s/p fall    TIA (transient ischemic attack)     Transient ischemic attack     Trigeminal neuralgia     possible, left side    Urinary incontinence     Vascular dementia        Past Surgical History:   Procedure Laterality Date    BLADDER SUSPENSION  2002    BLADDER SUSPENSION  2003    revision    BREAST BIOPSY Right     benign    CATARACT REMOVAL WITH IMPLANT Bilateral 2009    COLONOSCOPY  2005    COLONOSCOPY  2009    Dr. Baker Guard    COLONOSCOPY  12/11/12    Polyp in transverse colon (adenoma).  COLONOSCOPY  6/11/13    Tiny polyps x2 (adenomatous, hyperplastic). Diverticulosis.      LUMBAR SPINE SURGERY Right 10/3/2017    Right L4 & L5 TRANSFORAMINAL performed by Erna Huang MD at [address] FACET LEVEL 1 BILATERAL Bilateral 9/15/2017    Bilat L3 / L4 L4 / L5 L5/ S1 Facet Inj pm TECHNIQUE: CT of the abdomen and pelvis was performed with the administration of intravenous contrast. Multiplanar reformatted images are provided for review. Dose modulation, iterative reconstruction, and/or weight based adjustment of the mA/kV was utilized to reduce the radiation dose to as low as reasonably achievable. COMPARISON: April 2, 2017 HISTORY: ORDERING SYSTEM PROVIDED HISTORY: Right sided abd pain TECHNOLOGIST PROVIDED HISTORY: IV Contrast only Ordering Physician Provided Reason for Exam: Right abd pain, low back pain Acuity: Acute Type of Exam: Initial FINDINGS: Evaluation is limited by motion. Lower Chest: Mild cardiomegaly. No pleural effusions. Moderate-sized hiatal hernia. Organs: Hepatic steatosis. Stable hypodense lesion is seen within the left hepatic lobe measuring less than 1 cm. On the previous study density characteristics are consistent with a cyst requiring no additional follow-up. No gallstones. No pancreatic lesion. No splenomegaly. No adrenal lesion. No hydronephrosis. No renal calculus. Hypodensity within the lower pole of the right kidney measuring less than 1 cm likely represents a cyst requiring no additional follow-up. GI/Bowel: Evaluation of the gastrointestinal tract is suboptimal without oral contrast.  Moderate-sized hiatal hernia. No bowel obstruction. Sigmoid diverticulosis without acute diverticulitis. No acute inflammatory process is seen. Pelvis: No free fluid. No bladder calculus. Peritoneum/Retroperitoneum: No abdominal aortic aneurysm. No adenopathy. Bones/Soft Tissues: Small umbilical hernia contains fat. Degenerative changes are seen involving the hips and lumbar spine. No acute findings. Fluoro For Surgical Procedures    Result Date: 9/15/2017  Radiology exam is complete. No Radiologist dictation. Please follow up with ordering provider.      Xr Hip 2-3 Vw W Pelvis Right    Result Date: 9/20/2017  EXAMINATION: 3 VIEWS OF THE LUMBAR SPINE; have been reviewed with patient. Blood thinners must be held with permission from your cardiologist or primary care physician. A letter is  required to besent to PCP/Cardiologist regarding holding medications for procedure to decrease bleeding risk. Controlled Substances Monitoring:     Attestation: The Prescription Monitoring Report for this patient was reviewed today. Yenni Doe NP)  Documentation: No signs of potential drug abuse or diversion identified.  Yenni Doe NP)

## 2017-10-24 NOTE — PATIENT INSTRUCTIONS
[facility]  [address]  Telephone [phone]  Fax [phone]      PROCEDURE INSTRUCTIONS FOR  PAIN MANAGEMENT PROCEDURES WITHOUT IV SEDATION    Devante Carmona scheduled to see Dr. Dilma Sharp to undergo the following procedure:  Right L4 and L5 Transforaminal Epidural Steroid Injection    Procedure Date: ____Friday 11-3-17____  Arrival Time: ____9:00am____     Report to the [place] [id number], Registration office on the 1st floor in the hospital, after check in and signing of paper work you will then go to the second floor to the surgery center. 1. Stop the following medications prior to the procedure:    asprin    · Medication___aspirin___ held for __3__ days    2. Take all routine medications unless otherwise instructed. Ok to take vitamins and antiinflammatory medications    3. EATING & DRINKING:  Ok to eat or drink before the injection - no IV sedation will be used. 4. If you are allergic to contrast or iodine, you must take benadryl and prednisone prior to the injection to prevent an allergic reaction. Follow the directions on the prescription for the times to take the medication. 5. Oral valium can be prescribed if your are anxious about the injections or feel that you can not lay still during the injection. If you take valium, you must have a . Make arrangements for a family member or friend to drive you to the surgery center. Your ride must stay in the hospital while you are having the injection done. If they cannot stay, the injection will be rescheduled. The valium may affect your judgment following the procedure and driving a vehicle within 24 hours after the sedation could be dangerous. 6.  Wear simple loose clothing, which can be easily changed. 7.  Leave jewelry (including rings) and other valuables at home.      8.  You will be asked to sign several forms prior to surgery; patients under the age of 25 must have a parent or

## 2017-11-03 NOTE — OP NOTE
anesthetic injection. 3. RL4,5 transforaminal epidural steroid injection. CONSENT: Written consent was obtained from the patient on preprinted consent form after explaining the procedure, indications, potential complications and outcomes. Alternative treatments were also discussed. DISCUSSION: The patient was sterilely prepped and draped in the usual fashion in the prone position. Time out was verified for correct patient, side, level and procedure. SEDATION:   No conscious sedation was performed during the procedure.  The patient remained awake and conversed throughout the procedure.  The patient underwent pulse oximetry and blood pressure monitoring independently by a trained observer, as well as by a physician. PROCEDURE:  Under image-intensifier control, a 22 gauge needle x 5 inch spinal needle was guided successfully into the epidural space employing a posterior lateral/oblique approach. Needle aspiration was negative for heme or CSF. Instillation of  .5 mL of Omnipaque 240 contrast medium opacified the spinal nerve and demonstrated contiguous flow into the RL4 epidural space. No vascular spread was noted. Digital subtraction was not employed to evaluate for vascular spread. The patient was monitored for any untoward reaction to contrast medium before proceeding with procedure #2. The patient did not report pain reproduction in a concordant distribution. Following needle position verification, a test dose of .5 mL of sterile saline was administered and patient monitored for any adverse effects. Then, 1 mL of Dexamethasone (Decadron 10 mg/mL)   was instilled into the epidural space and the patient's response was again monitored. Finally,  1 ml of 0.5% bupivacaine was then instilled. The patient's response was again monitored. The spinal needle was removed.       nstillation of  .5 mL of Omnipaque 240 contrast medium opacified the spinal nerve and demonstrated contiguous flow into the RL 5 epidural space. No vascular spread was noted. Digital subtraction was not employed to evaluate for vascular spread. The patient was monitored for any untoward reaction to contrast medium before proceeding with procedure #2. The patient did not report pain reproduction in a concordant distribution. Following needle position verification, a test dose of .5 mL of sterile saline was administered and patient monitored for any adverse effects. Then, 1 mL of Dexamethasone (Decadron 10 mg/mL)   was instilled into the epidural space and the patient's response was again monitored. Finally,  1 ml of 0.5% bupivacaine was then instilled. The patient's response was again monitored. The spinal needle was removed. The patient tolerated the procedure well and without complications and was noted to be in stable condition prior to discharge from the procedure center with discharge instructions. EBL: no blood loss    SPECIMEN: none    IMPRESSIONS:  1. RL4,5 transforaminal epidurogram, epidural anesthesia and epidural steroid injection procedures accomplished without incident. RECOMMENDATIONS:  1. Complete and return Post-Procedure Pain and Activity Diary.   2. Contact the [address] for symptom exacerbation, fever or unusual symptoms. 3. Post-procedure care according to verbal and written discharge instructions    POST-PROCEDURE EPIDUROGRAPHY INTERPRETATION:    EXAMINATION: AP, lateral, and oblique views    FLUORO TIME: 17 seconds    DISCUSSION: Spot views of the spine reveal normal alignment and segmentation. Spinal needle is positioned at the RL4,5 neuroforamin. Contrast spreads and outlines the RL4,5 nerve/ neuroforamin and epidural space. The epidurogram reveals excellent contrast flow. Visualized spine reveals See radiology report. Soft tissues reveal no abnormalities. IMPRESSION: RL4,5 transforaminal epidurogram/epineurogram reveals satisfactory needle position and contrast spread.

## 2017-11-03 NOTE — INTERVAL H&P NOTE
I have interviewed and examined the patient and reviewed the recent History and Physical.  There have been no changes to the recent H&P documentation. The surgical consent form has been signed. Last anticoagulant medication use was:3-5 days    Premedication taken for contrast allergy? No    Valium taken for oral sedation? No    Outpatient Prescriptions Marked as Taking for the 11/3/17 encounter [facility] Encounter)   Medication Sig Dispense Refill    HYDROcodone-acetaminophen (NORCO) 5-325 MG per tablet Take 1 tablet by mouth every 8 hours as needed for Pain  Take 1 tablet by mouth every 4 hours as needed for Pain . Дмитрий Ramal 42 tablet 0    memantine (NAMENDA) 10 MG tablet TAKE 1 TABLET TWICE A  tablet 3    gabapentin (NEURONTIN) 100 MG capsule Gabapentin (Neurontin) Dosing Instructions: 100mg every night for 3 nights, then 100mg every 12 hours for 3 days, then 100mg every 8 hours. 90 capsule 3    cyclobenzaprine (FLEXERIL) 5 MG tablet Take 1 tablet by mouth 3 times daily as needed for Muscle spasms 30 tablet 3    sertraline (ZOLOFT) 100 MG tablet TAKE 1 TABLET DAILY 90 tablet 3    simethicone (MYLICON) 717 MG chewable tablet Take 1 tablet by mouth every 6 hours as needed for Flatulence 30 tablet 0    oxybutynin (DITROPAN XL) 5 MG extended release tablet Take 1 tablet by mouth daily 90 tablet 3    montelukast (SINGULAIR) 10 MG tablet TAKE 1 TABLET DAILY 90 tablet 3    Cholecalciferol (VITAMIN D3) 5000 UNITS TABS Take 1 tablet by mouth daily      donepezil (ARICEPT) 10 MG tablet TAKE 1 TABLET DAILY 90 tablet 3    vitamin E 400 UNIT capsule Take 400 Units by mouth daily.  Multiple Vitamins-Minerals (MULTIVITAMIN PO) Take 1 tablet by mouth daily.  Naproxen Sodium (ALEVE) 220 MG CAPS Take 2 tablets by mouth daily.  magnesium oxide (MAG-OX) 400 MG tablet Take 400 mg by mouth daily.       EPIPEN 2-VIKRAM 0.3 MG/0.3ML SOAJ injection USE AS DIRECTED FOR ALLERGIC REACTION 2 each 0       The patient

## 2017-11-03 NOTE — H&P
Conjunctivae and lids are normal. No scleral icterus. Neck: Phonation normal.   Cardiovascular:   No BLE edema present   Pulmonary/Chest: Effort normal. No accessory muscle usage. No respiratory distress. Abdominal: Normal appearance. Genitourinary:   Genitourinary Comments: deferred   Musculoskeletal: She exhibits tenderness ( Kemps B , - slump ? SLR B  FABERS  neg  scars B anterior knees). Xr Lumbar Spine (2-3 Views)    Result Date: 9/20/2017  EXAMINATION: 3 VIEWS OF THE LUMBAR SPINE; SINGLE VIEW OF THE PELVIS AND 2 VIEWS RIGHT HIP 9/20/2017 11:24 am COMPARISON: AP pelvis 08/25/2011. HISTORY: ORDERING SYSTEM PROVIDED HISTORY: Low back pain radiating to right leg TECHNOLOGIST PROVIDED HISTORY: Reason for exam:->low back pain with radiculopathy Ordering Physician Provided Reason for Exam: Low back pain 2-3 years; pain is worse since having injections 5 days ago FINDINGS: Lumbar spine, three views: Lumbar vertebral alignment is maintained. Mild osteopenia with no acute fracture. Advanced multilevel lumbar spondylosis, most prevalent at T12-L1 anteriorly and on the right at L1-2. Mild multilevel degenerative disc space narrowing. Advanced multilevel facet arthropathy. Mild scoliotic curvature, apex to the right centered at L2. Scattered visceral vascular calcification. AP pelvis right hip: No acute fracture deformity. Moderate osteoarthritic changes of the hips, with axial narrowing and marginal spurring. The SI joints are unremarkable. No focal soft tissue abnormality. Postoperative hardware in the low pelvis. 1. No acute osseous abnormality of the lumbar spine. Advanced multilevel lumbar spondylosis and facet arthropathy. 2. No acute osseous abnormality of the pelvis or right hip. Moderate osteoarthritic changes of the hips bilaterally.      Mri Lumbar Spine Wo Contrast    Result Date: 9/27/2017  EXAMINATION: MRI OF THE LUMBAR SPINE WITHOUT CONTRAST, 9/27/2017 4:11 pm TECHNIQUE: Multiplanar where there is severe central and lateral spinal stenosis. RECOMMENDATIONS: The findings were sent to the Radiology Results [id number] at 5:53 pm on 9/27/2017to be communicated to a licensed caregiver. Ct Abdomen Pelvis W Iv Contrast    Result Date: 9/18/2017  EXAMINATION: CT OF THE ABDOMEN AND PELVIS WITH CONTRAST 9/18/2017 1:23 pm TECHNIQUE: CT of the abdomen and pelvis was performed with the administration of intravenous contrast. Multiplanar reformatted images are provided for review. Dose modulation, iterative reconstruction, and/or weight based adjustment of the mA/kV was utilized to reduce the radiation dose to as low as reasonably achievable. COMPARISON: April 2, 2017 HISTORY: ORDERING SYSTEM PROVIDED HISTORY: Right sided abd pain TECHNOLOGIST PROVIDED HISTORY: IV Contrast only Ordering Physician Provided Reason for Exam: Right abd pain, low back pain Acuity: Acute Type of Exam: Initial FINDINGS: Evaluation is limited by motion. Lower Chest: Mild cardiomegaly. No pleural effusions. Moderate-sized hiatal hernia. Organs: Hepatic steatosis. Stable hypodense lesion is seen within the left hepatic lobe measuring less than 1 cm. On the previous study density characteristics are consistent with a cyst requiring no additional follow-up. No gallstones. No pancreatic lesion. No splenomegaly. No adrenal lesion. No hydronephrosis. No renal calculus. Hypodensity within the lower pole of the right kidney measuring less than 1 cm likely represents a cyst requiring no additional follow-up. GI/Bowel: Evaluation of the gastrointestinal tract is suboptimal without oral contrast.  Moderate-sized hiatal hernia. No bowel obstruction. Sigmoid diverticulosis without acute diverticulitis. No acute inflammatory process is seen. Pelvis: No free fluid. No bladder calculus. Peritoneum/Retroperitoneum: No abdominal aortic aneurysm. No adenopathy. Bones/Soft Tissues: Small umbilical hernia contains fat. Degenerative changes are seen involving the hips and lumbar spine. No acute findings. Fluoro For Surgical Procedures    Result Date: 9/15/2017  Radiology exam is complete. No Radiologist dictation. Please follow up with ordering provider. Xr Hip 2-3 Vw W Pelvis Right    Result Date: 9/20/2017  EXAMINATION: 3 VIEWS OF THE LUMBAR SPINE; SINGLE VIEW OF THE PELVIS AND 2 VIEWS RIGHT HIP 9/20/2017 11:24 am COMPARISON: AP pelvis 08/25/2011. HISTORY: ORDERING SYSTEM PROVIDED HISTORY: Low back pain radiating to right leg TECHNOLOGIST PROVIDED HISTORY: Reason for exam:->low back pain with radiculopathy Ordering Physician Provided Reason for Exam: Low back pain 2-3 years; pain is worse since having injections 5 days ago FINDINGS: Lumbar spine, three views: Lumbar vertebral alignment is maintained. Mild osteopenia with no acute fracture. Advanced multilevel lumbar spondylosis, most prevalent at T12-L1 anteriorly and on the right at L1-2. Mild multilevel degenerative disc space narrowing. Advanced multilevel facet arthropathy. Mild scoliotic curvature, apex to the right centered at L2. Scattered visceral vascular calcification. AP pelvis right hip: No acute fracture deformity. Moderate osteoarthritic changes of the hips, with axial narrowing and marginal spurring. The SI joints are unremarkable. No focal soft tissue abnormality. Postoperative hardware in the low pelvis. 1. No acute osseous abnormality of the lumbar spine. Advanced multilevel lumbar spondylosis and facet arthropathy. 2. No acute osseous abnormality of the pelvis or right hip. Moderate osteoarthritic changes of the hips bilaterally. Neurological: She is alert and oriented to person, place, and time. Skin: Skin is warm, dry and intact. No rash noted. She is not diaphoretic. No cyanosis or erythema. No pallor. Psychiatric: She has a normal mood and affect. Her speech is normal and behavior is normal.         Assessment:      1. Chronic right-sided low back pain with right-sided sciatica    2. Lumbar facet joint syndrome    3. Neural foraminal stenosis of cervical spine                               Plan:    Schedule repeat right L4, L5 TFE  Will refill [place] for chronic pain control     Informed consent has been obtained for procedure. Raheel Nazario  on blood thinners. Medications to hold have been reviewed with patient. Blood thinners must be held with permission from your cardiologist or primary care physician. A letter is  required to besent to PCP/Cardiologist regarding holding medications for procedure to decrease bleeding risk. Controlled Substances Monitoring:      Attestation: The Prescription Monitoring Report for this patient was reviewed today. Mike Adam NP)  Documentation: No signs of potential drug abuse or diversion identified.  Mike Adam NP)

## 2017-11-17 NOTE — PROGRESS NOTES
Subjective:      Patient ID: Shira Gilmore is a 80 y.o. female. Chief Complaint   Patient presents with    Lower Back Pain     2 wk f/u post TFE on 11-3-17     HPI Following up post right TFE. States pain relief accompanied by improved activity/function. Pain Assessment  Location of Pain: Back  Location Modifiers:  (lower)  Severity of Pain: 6  Quality of Pain: Aching, Dull  Duration of Pain: Persistent  Frequency of Pain: Constant  Aggravating Factors: Walking (movement)  Limiting Behavior: Yes  Relieving Factors:  (nothing)  Are there other pain locations you wish to document?: No    Allergies   Allergen Reactions    Bee Venom Anaphylaxis     Allergic to bee stings    Codeine     Sulfa Antibiotics        Outpatient Prescriptions Marked as Taking for the 11/17/17 encounter (Office Visit) with No Chavez NP   Medication Sig Dispense Refill    HYDROcodone-acetaminophen (NORCO) 5-325 MG per tablet Take 1 tablet by mouth every 8 hours as needed for Pain  Take 1 tablet by mouth every 4 hours as needed for Pain . Tami Cordon 42 tablet 0    memantine (NAMENDA) 10 MG tablet TAKE 1 TABLET TWICE A  tablet 3    gabapentin (NEURONTIN) 100 MG capsule Gabapentin (Neurontin) Dosing Instructions: 100mg every night for 3 nights, then 100mg every 12 hours for 3 days, then 100mg every 8 hours.  90 capsule 3    cyclobenzaprine (FLEXERIL) 5 MG tablet Take 1 tablet by mouth 3 times daily as needed for Muscle spasms 30 tablet 3    sertraline (ZOLOFT) 100 MG tablet TAKE 1 TABLET DAILY 90 tablet 3    simethicone (MYLICON) 415 MG chewable tablet Take 1 tablet by mouth every 6 hours as needed for Flatulence 30 tablet 0    oxybutynin (DITROPAN XL) 5 MG extended release tablet Take 1 tablet by mouth daily 90 tablet 3    aspirin 325 MG EC tablet Take 1 tablet by mouth daily 30 tablet     montelukast (SINGULAIR) 10 MG tablet TAKE 1 TABLET DAILY 90 tablet 3    Cholecalciferol (VITAMIN D3) 5000 UNITS TABS Take 1 tablet by mouth daily      donepezil (ARICEPT) 10 MG tablet TAKE 1 TABLET DAILY 90 tablet 3    vitamin E 400 UNIT capsule Take 400 Units by mouth daily.  Multiple Vitamins-Minerals (MULTIVITAMIN PO) Take 1 tablet by mouth daily.  Naproxen Sodium (ALEVE) 220 MG CAPS Take 2 tablets by mouth daily.  magnesium oxide (MAG-OX) 400 MG tablet Take 400 mg by mouth daily.  EPIPEN 2-VIKRAM 0.3 MG/0.3ML SOAJ injection USE AS DIRECTED FOR ALLERGIC REACTION 2 each 0       Past Medical History:   Diagnosis Date    Allergic rhinitis     Alzheimer's dementia     Arterial ischemic stroke ([facility] [address] 2000 and 2001    Chronic cerebral ischemia     Chronic lumbar radiculopathy     Confusion     Depression     Diverticulosis     Dizziness     Elevated fasting glucose     Essential tremor     Fatigue     GERD (gastroesophageal reflux disease)     Herpes keratitis     left eye    Hyperactivity of bladder     Hyperlipidemia     Left shoulder pain     Memory problem     ARIADNA on CPAP     Osteoarthritis     Peripheral neuropathy (HCC)     Pseudophakia     bilateral    Right leg pain     s/p fall    TIA (transient ischemic attack)     Transient ischemic attack     Trigeminal neuralgia     possible, left side    Urinary incontinence     Vascular dementia        Past Surgical History:   Procedure Laterality Date    BLADDER SUSPENSION  2002    BLADDER SUSPENSION  2003    revision    BREAST BIOPSY Right     benign    CATARACT REMOVAL WITH IMPLANT Bilateral 2009    COLONOSCOPY  2005    COLONOSCOPY  2009    Dr. Gabino Lopez    COLONOSCOPY  12/11/12    Polyp in transverse colon (adenoma).  COLONOSCOPY  6/11/13    Tiny polyps x2 (adenomatous, hyperplastic). Diverticulosis.      LUMBAR SPINE SURGERY Right 10/3/2017    Right L4 & L5 TRANSFORAMINAL performed by Marylene Baton, MD at [address] [place] [address] Dr Rosa 11/3/2017    Right L4 & L5 Transforaminal performed by Marylene Baton, MD at [address] [place] [address] LUMB FACET LEVEL 1 BILATERAL Bilateral 9/15/2017    Bilat L3 / L4 L4 / L5 L5/ S1 Facet Inj performed by Lion Parrish MD at [address] [place] [address]  June 2010    Basal cell carcinoma removal, Dr. Barry Gayle  Nov 2010    Basal cell carcinoma removal, Dr. Hiwot Esparza Right 2003   [place] ENDOSCOPY  2007    with modified barium swallow       Family History   Problem Relation Age of Onset    Heart Failure Mother     Stroke Father     COPD Sister     COPD Sister        Social History     Social History    Marital status:      Spouse name: N/A    Number of children: N/A    Years of education: N/A     Social History Main Topics    Smoking status: Never Smoker    Smokeless tobacco: Never Used    Alcohol use No    Drug use: No    Sexual activity: Not Asked     Other Topics Concern    None     Social History Narrative    None     Review of Systems   Constitutional: Positive for activity change (improved). Musculoskeletal: Positive for arthralgias, back pain (improved) and myalgias. Psychiatric/Behavioral: Positive for sleep disturbance. Objective:   Physical Exam   Constitutional: She is oriented to person, place, and time. Vital signs are normal. She appears well-developed and well-nourished. She is active and cooperative. Non-toxic appearance. She does not have a sickly appearance. She does not appear ill. No distress. HENT:   Head: Normocephalic and atraumatic. Cardiovascular: Normal rate. Pulmonary/Chest: Effort normal. No accessory muscle usage. No respiratory distress. Neurological: She is alert and oriented to person, place, and time. No cranial nerve deficit. GCS eye subscore is 4. GCS verbal subscore is 5. GCS motor subscore is 6. Skin: Skin is warm, dry and intact. She is not diaphoretic. No cyanosis. No pallor. Nails show no clubbing.    Psychiatric: She has a normal mood and affect. Her speech is normal.   Vitals reviewed. Assessment:      1. Lumbar facet joint syndrome    2. Lumbar radiculopathy    3.  Chronic right-sided low back pain with right-sided sciatica          Plan:    Follow up 6 weeks, reschedule TFE is pain returns

## 2017-11-20 NOTE — TELEPHONE ENCOUNTER
Patient's wife Veronica Butterfield is calling to cancel her husbands appointment for tomorrow 11-24-17. She just had surgery and they won't be able to come. Please call Bhavna back to reschedule.  [phone]

## 2017-12-05 NOTE — PROGRESS NOTES
Pimentel    COLONOSCOPY  12/11/12    Polyp in transverse colon (adenoma).  COLONOSCOPY  6/11/13    Tiny polyps x2 (adenomatous, hyperplastic). Diverticulosis.  LUMBAR SPINE SURGERY Right 10/3/2017    Right L4 & L5 TRANSFORAMINAL performed by Guillermo Causey MD at [address] [place] [address] Dr Rosa 11/3/2017    Right L4 & L5 Transforaminal performed by Guillermo Causey MD at [address] [place] [address] FACET LEVEL 1 BILATERAL Bilateral 9/15/2017    Bilat L3 / L4 L4 / L5 L5/ S1 Facet Inj performed by Guillermo Causey MD at [facility] June 2010    Basal cell carcinoma removal, Dr. Bee Madrid  Nov 2010    Basal cell carcinoma removal, Dr. Sinan Turner Right 2003   [place] ENDOSCOPY  2007    with modified barium swallow     Family History   Problem Relation Age of Onset    Heart Failure Mother     Stroke Father     COPD Sister     COPD Sister      Social History   Substance Use Topics    Smoking status: Never Smoker    Smokeless tobacco: Never Used    Alcohol use No      Current Outpatient Prescriptions   Medication Sig Dispense Refill    HYDROcodone-acetaminophen (NORCO) 5-325 MG per tablet Take 1 tablet by mouth every 8 hours as needed for Pain  Take 1 tablet by mouth every 4 hours as needed for Pain . Gevena Monica 42 tablet 0    memantine (NAMENDA) 10 MG tablet TAKE 1 TABLET TWICE A  tablet 3    gabapentin (NEURONTIN) 100 MG capsule Gabapentin (Neurontin) Dosing Instructions: 100mg every night for 3 nights, then 100mg every 12 hours for 3 days, then 100mg every 8 hours.  90 capsule 3    cyclobenzaprine (FLEXERIL) 5 MG tablet Take 1 tablet by mouth 3 times daily as needed for Muscle spasms 30 tablet 3    sertraline (ZOLOFT) 100 MG tablet TAKE 1 TABLET DAILY 90 tablet 3    simethicone (MYLICON) 001 MG chewable tablet Take 1 tablet by mouth every 6 hours as needed for Flatulence 30 tablet 0  oxybutynin (DITROPAN XL) 5 MG extended release tablet Take 1 tablet by mouth daily 90 tablet 3    aspirin 325 MG EC tablet Take 1 tablet by mouth daily 30 tablet     montelukast (SINGULAIR) 10 MG tablet TAKE 1 TABLET DAILY 90 tablet 3    Cholecalciferol (VITAMIN D3) 5000 UNITS TABS Take 1 tablet by mouth daily      donepezil (ARICEPT) 10 MG tablet TAKE 1 TABLET DAILY 90 tablet 3    vitamin E 400 UNIT capsule Take 400 Units by mouth daily.  Multiple Vitamins-Minerals (MULTIVITAMIN PO) Take 1 tablet by mouth daily.  Naproxen Sodium (ALEVE) 220 MG CAPS Take 2 tablets by mouth daily.  magnesium oxide (MAG-OX) 400 MG tablet Take 400 mg by mouth daily.  EPIPEN 2-VIKRAM 0.3 MG/0.3ML SOAJ injection USE AS DIRECTED FOR ALLERGIC REACTION 2 each 0     No current facility-administered medications for this visit. Allergies   Allergen Reactions    Bee Venom Anaphylaxis     Allergic to bee stings    Codeine     Sulfa Antibiotics        Health Maintenance   Topic Date Due    Zostavax vaccine  03/04/1993    DTaP/Tdap/Td vaccine (1 - Tdap) 10/22/2004    Pneumococcal low/med risk (2 of 2 - PCV13) 02/25/2016    Flu vaccine (1) 09/01/2017    Colon cancer screen colonoscopy  06/11/2018    DEXA (modify frequency per FRAX score)  Completed       Subjective:      Review of Systems   Musculoskeletal: Positive for back pain. Psychiatric/Behavioral: Positive for agitation, confusion and decreased concentration. The patient is nervous/anxious. All other systems reviewed and are negative. Objective:     Vitals:    12/05/17 1106   BP: 124/80   Site: Left Arm   Position: Sitting   Cuff Size: Medium Adult   Pulse: 86   Weight: 184 lb (83.5 kg)   Height: 5' 8\" (1.727 m)     Physical Exam   Constitutional: She appears well-developed and well-nourished. No distress. HENT:   Head: Normocephalic and atraumatic.    Nose: Nose normal.   Mouth/Throat: Oropharynx is clear and moist. No oropharyngeal

## 2018-01-01 ENCOUNTER — OFFICE VISIT (OUTPATIENT)
Dept: FAMILY MEDICINE CLINIC | Age: 83
End: 2018-01-01
Payer: MEDICARE

## 2018-01-01 ENCOUNTER — HOSPITAL ENCOUNTER (OUTPATIENT)
Dept: LAB | Age: 83
Setting detail: SPECIMEN
Discharge: HOME | End: 2018-02-08
Payer: MEDICARE

## 2018-01-01 ENCOUNTER — OFFICE VISIT (OUTPATIENT)
Dept: PAIN MANAGEMENT | Age: 83
End: 2018-01-01
Payer: MEDICARE

## 2018-01-01 ENCOUNTER — APPOINTMENT (OUTPATIENT)
Dept: GENERAL RADIOLOGY | Age: 83
End: 2018-01-01
Attending: PHYSICAL MEDICINE & REHABILITATION
Payer: MEDICARE

## 2018-01-01 ENCOUNTER — APPOINTMENT (OUTPATIENT)
Dept: CT IMAGING | Age: 83
End: 2018-01-01
Payer: MEDICARE

## 2018-01-01 ENCOUNTER — TELEPHONE (OUTPATIENT)
Dept: FAMILY MEDICINE CLINIC | Age: 83
End: 2018-01-01
Payer: MEDICARE

## 2018-01-01 ENCOUNTER — TELEPHONE (OUTPATIENT)
Dept: FAMILY MEDICINE CLINIC | Age: 83
End: 2018-01-01

## 2018-01-01 ENCOUNTER — TELEPHONE (OUTPATIENT)
Dept: PAIN MANAGEMENT | Age: 83
End: 2018-01-01

## 2018-01-01 ENCOUNTER — HOSPITAL ENCOUNTER (OUTPATIENT)
Age: 83
Setting detail: OUTPATIENT SURGERY
Discharge: HOME | End: 2018-03-09
Attending: PHYSICAL MEDICINE & REHABILITATION | Admitting: PHYSICAL MEDICINE & REHABILITATION
Payer: MEDICARE

## 2018-01-01 ENCOUNTER — APPOINTMENT (OUTPATIENT)
Dept: GENERAL RADIOLOGY | Age: 83
End: 2018-01-01
Payer: MEDICARE

## 2018-01-01 ENCOUNTER — HOSPITAL ENCOUNTER (OUTPATIENT)
Age: 83
Setting detail: SPECIMEN
Discharge: HOME | End: 2018-03-07
Payer: MEDICARE

## 2018-01-01 ENCOUNTER — OFFICE VISIT (OUTPATIENT)
Dept: PRIMARY CARE CLINIC | Age: 83
End: 2018-01-01
Payer: MEDICARE

## 2018-01-01 ENCOUNTER — HOSPITAL ENCOUNTER (OUTPATIENT)
Dept: GENERAL RADIOLOGY | Age: 83
Discharge: HOME | End: 2018-04-14
Payer: MEDICARE

## 2018-01-01 ENCOUNTER — HOSPITAL ENCOUNTER (OUTPATIENT)
Age: 83
Setting detail: OUTPATIENT SURGERY
Discharge: HOME | End: 2018-01-26
Attending: PHYSICAL MEDICINE & REHABILITATION | Admitting: PHYSICAL MEDICINE & REHABILITATION
Payer: MEDICARE

## 2018-01-01 ENCOUNTER — HOSPITAL ENCOUNTER (EMERGENCY)
Age: 83
Discharge: HOME | End: 2018-01-12
Attending: EMERGENCY MEDICINE
Payer: MEDICARE

## 2018-01-01 VITALS
HEIGHT: 69 IN | HEART RATE: 77 BPM | WEIGHT: 188.7 LBS | SYSTOLIC BLOOD PRESSURE: 120 MMHG | OXYGEN SATURATION: 92 % | BODY MASS INDEX: 27.95 KG/M2 | DIASTOLIC BLOOD PRESSURE: 72 MMHG | TEMPERATURE: 98.6 F

## 2018-01-01 VITALS
RESPIRATION RATE: 18 BRPM | DIASTOLIC BLOOD PRESSURE: 80 MMHG | OXYGEN SATURATION: 98 % | WEIGHT: 183.6 LBS | HEART RATE: 86 BPM | BODY MASS INDEX: 27.83 KG/M2 | SYSTOLIC BLOOD PRESSURE: 115 MMHG | HEIGHT: 68 IN

## 2018-01-01 VITALS
WEIGHT: 185 LBS | HEIGHT: 69 IN | BODY MASS INDEX: 27.4 KG/M2 | OXYGEN SATURATION: 98 % | RESPIRATION RATE: 16 BRPM | HEART RATE: 79 BPM | SYSTOLIC BLOOD PRESSURE: 131 MMHG | TEMPERATURE: 97 F | DIASTOLIC BLOOD PRESSURE: 65 MMHG

## 2018-01-01 VITALS
RESPIRATION RATE: 16 BRPM | HEIGHT: 69 IN | DIASTOLIC BLOOD PRESSURE: 60 MMHG | SYSTOLIC BLOOD PRESSURE: 115 MMHG | BODY MASS INDEX: 27.58 KG/M2 | WEIGHT: 186.2 LBS

## 2018-01-01 VITALS
TEMPERATURE: 46.4 F | HEART RATE: 79 BPM | OXYGEN SATURATION: 100 % | BODY MASS INDEX: 28.49 KG/M2 | RESPIRATION RATE: 16 BRPM | HEIGHT: 68 IN | WEIGHT: 188 LBS | SYSTOLIC BLOOD PRESSURE: 153 MMHG | DIASTOLIC BLOOD PRESSURE: 91 MMHG

## 2018-01-01 VITALS
HEIGHT: 69 IN | WEIGHT: 176 LBS | OXYGEN SATURATION: 98 % | BODY MASS INDEX: 26.07 KG/M2 | DIASTOLIC BLOOD PRESSURE: 68 MMHG | HEART RATE: 82 BPM | SYSTOLIC BLOOD PRESSURE: 126 MMHG

## 2018-01-01 VITALS
HEART RATE: 104 BPM | DIASTOLIC BLOOD PRESSURE: 70 MMHG | SYSTOLIC BLOOD PRESSURE: 118 MMHG | HEIGHT: 69 IN | BODY MASS INDEX: 26.96 KG/M2 | WEIGHT: 182 LBS

## 2018-01-01 VITALS
WEIGHT: 185 LBS | SYSTOLIC BLOOD PRESSURE: 110 MMHG | DIASTOLIC BLOOD PRESSURE: 68 MMHG | HEART RATE: 84 BPM | OXYGEN SATURATION: 98 % | BODY MASS INDEX: 27.32 KG/M2 | TEMPERATURE: 97.8 F

## 2018-01-01 VITALS
RESPIRATION RATE: 16 BRPM | DIASTOLIC BLOOD PRESSURE: 104 MMHG | HEART RATE: 76 BPM | TEMPERATURE: 99.1 F | OXYGEN SATURATION: 100 % | SYSTOLIC BLOOD PRESSURE: 149 MMHG

## 2018-01-01 VITALS
SYSTOLIC BLOOD PRESSURE: 118 MMHG | WEIGHT: 183 LBS | DIASTOLIC BLOOD PRESSURE: 78 MMHG | HEIGHT: 67 IN | BODY MASS INDEX: 28.72 KG/M2

## 2018-01-01 VITALS
HEIGHT: 69 IN | DIASTOLIC BLOOD PRESSURE: 70 MMHG | WEIGHT: 184.2 LBS | OXYGEN SATURATION: 98 % | BODY MASS INDEX: 27.28 KG/M2 | HEART RATE: 90 BPM | SYSTOLIC BLOOD PRESSURE: 122 MMHG

## 2018-01-01 DIAGNOSIS — I67.82 CHRONIC CEREBRAL ISCHEMIA: ICD-10-CM

## 2018-01-01 DIAGNOSIS — M54.50 LOW BACK PAIN RADIATING TO RIGHT LEG: ICD-10-CM

## 2018-01-01 DIAGNOSIS — K21.9 GASTROESOPHAGEAL REFLUX DISEASE WITHOUT ESOPHAGITIS: ICD-10-CM

## 2018-01-01 DIAGNOSIS — I10 ESSENTIAL HYPERTENSION: Primary | ICD-10-CM

## 2018-01-01 DIAGNOSIS — M54.06 PANNICULITIS INVOLVING LUMBAR REGION: ICD-10-CM

## 2018-01-01 DIAGNOSIS — M54.50 LOW BACK PAIN RADIATING TO RIGHT LEG: Primary | ICD-10-CM

## 2018-01-01 DIAGNOSIS — Z91.81 AT HIGH RISK FOR FALLS: ICD-10-CM

## 2018-01-01 DIAGNOSIS — M48.061 NEURAL FORAMINAL STENOSIS OF LUMBAR SPINE: ICD-10-CM

## 2018-01-01 DIAGNOSIS — F02.80 LATE ONSET ALZHEIMER'S DISEASE WITHOUT BEHAVIORAL DISTURBANCE (HCC): ICD-10-CM

## 2018-01-01 DIAGNOSIS — M47.816 LUMBAR FACET JOINT SYNDROME: ICD-10-CM

## 2018-01-01 DIAGNOSIS — G30.1 LATE ONSET ALZHEIMER'S DISEASE WITH BEHAVIORAL DISTURBANCE (HCC): ICD-10-CM

## 2018-01-01 DIAGNOSIS — G89.29 CHRONIC BILATERAL LOW BACK PAIN, WITH SCIATICA PRESENCE UNSPECIFIED: ICD-10-CM

## 2018-01-01 DIAGNOSIS — I10 ESSENTIAL HYPERTENSION, BENIGN: ICD-10-CM

## 2018-01-01 DIAGNOSIS — F02.818 LATE ONSET ALZHEIMER'S DISEASE WITH BEHAVIORAL DISTURBANCE (HCC): ICD-10-CM

## 2018-01-01 DIAGNOSIS — M54.16 CHRONIC LUMBAR RADICULOPATHY: Primary | ICD-10-CM

## 2018-01-01 DIAGNOSIS — M48.061 NEURAL FORAMINAL STENOSIS OF LUMBAR SPINE: Primary | ICD-10-CM

## 2018-01-01 DIAGNOSIS — M79.604 LOW BACK PAIN RADIATING TO RIGHT LEG: Primary | ICD-10-CM

## 2018-01-01 DIAGNOSIS — M54.16 LUMBAR RADICULOPATHY: ICD-10-CM

## 2018-01-01 DIAGNOSIS — M54.41 ACUTE BACK PAIN WITH SCIATICA, RIGHT: Primary | ICD-10-CM

## 2018-01-01 DIAGNOSIS — G62.9 POLYNEUROPATHY: ICD-10-CM

## 2018-01-01 DIAGNOSIS — M15.9 PRIMARY OSTEOARTHRITIS INVOLVING MULTIPLE JOINTS: Primary | ICD-10-CM

## 2018-01-01 DIAGNOSIS — B96.89 ACUTE BACTERIAL SINUSITIS: Primary | ICD-10-CM

## 2018-01-01 DIAGNOSIS — G89.29 CHRONIC RIGHT-SIDED LOW BACK PAIN WITH RIGHT-SIDED SCIATICA: ICD-10-CM

## 2018-01-01 DIAGNOSIS — F32.9 MAJOR DEPRESSIVE DISORDER WITH SINGLE EPISODE, REMISSION STATUS UNSPECIFIED: ICD-10-CM

## 2018-01-01 DIAGNOSIS — Z48.02 ENCOUNTER FOR REMOVAL OF SUTURES: Primary | ICD-10-CM

## 2018-01-01 DIAGNOSIS — M54.5 CHRONIC BILATERAL LOW BACK PAIN, WITH SCIATICA PRESENCE UNSPECIFIED: ICD-10-CM

## 2018-01-01 DIAGNOSIS — M79.604 LOW BACK PAIN RADIATING TO RIGHT LEG: ICD-10-CM

## 2018-01-01 DIAGNOSIS — E78.5 HYPERLIPIDEMIA, UNSPECIFIED HYPERLIPIDEMIA TYPE: ICD-10-CM

## 2018-01-01 DIAGNOSIS — M54.16 CHRONIC LUMBAR RADICULOPATHY: ICD-10-CM

## 2018-01-01 DIAGNOSIS — J01.90 ACUTE BACTERIAL SINUSITIS: Primary | ICD-10-CM

## 2018-01-01 DIAGNOSIS — S01.01XA LACERATION OF SCALP, INITIAL ENCOUNTER: ICD-10-CM

## 2018-01-01 DIAGNOSIS — F02.81 DEMENTIA IN OTHER DISEASES CLASSIFIED ELSEWHERE WITH BEHAVIORAL DISTURBANCE: ICD-10-CM

## 2018-01-01 DIAGNOSIS — R30.0 DYSURIA: Primary | ICD-10-CM

## 2018-01-01 DIAGNOSIS — I10 ESSENTIAL HYPERTENSION: ICD-10-CM

## 2018-01-01 DIAGNOSIS — J30.9 ALLERGIC RHINITIS, UNSPECIFIED CHRONICITY, UNSPECIFIED SEASONALITY, UNSPECIFIED TRIGGER: ICD-10-CM

## 2018-01-01 DIAGNOSIS — R53.1 GENERALIZED WEAKNESS: ICD-10-CM

## 2018-01-01 DIAGNOSIS — G45.9 TRANSIENT CEREBRAL ISCHEMIA, UNSPECIFIED TYPE: Primary | ICD-10-CM

## 2018-01-01 DIAGNOSIS — R30.0 DYSURIA: ICD-10-CM

## 2018-01-01 DIAGNOSIS — G47.33 OBSTRUCTIVE SLEEP APNEA SYNDROME: ICD-10-CM

## 2018-01-01 DIAGNOSIS — M48.061 SPINAL STENOSIS, LUMBAR REGION, WITHOUT NEUROGENIC CLAUDICATION: ICD-10-CM

## 2018-01-01 DIAGNOSIS — S09.90XA INJURY OF HEAD, INITIAL ENCOUNTER: Primary | ICD-10-CM

## 2018-01-01 DIAGNOSIS — F32.A DEPRESSION, UNSPECIFIED DEPRESSION TYPE: ICD-10-CM

## 2018-01-01 DIAGNOSIS — M54.41 CHRONIC RIGHT-SIDED LOW BACK PAIN WITH RIGHT-SIDED SCIATICA: ICD-10-CM

## 2018-01-01 DIAGNOSIS — G30.1 LATE ONSET ALZHEIMER'S DISEASE WITHOUT BEHAVIORAL DISTURBANCE (HCC): ICD-10-CM

## 2018-01-01 LAB
-: ABNORMAL
AMORPHOUS: ABNORMAL
ANION GAP SERPL CALCULATED.3IONS-SCNC: 12 MMOL/L (ref 9–17)
BACTERIA: ABNORMAL
BILIRUBIN URINE: NEGATIVE
BUN BLDV-MCNC: 14 MG/DL (ref 8–23)
BUN/CREAT BLD: 25 (ref 9–20)
CALCIUM SERPL-MCNC: 9.4 MG/DL (ref 8.6–10.4)
CASTS UA: ABNORMAL /LPF (ref 0–2)
CHLORIDE BLD-SCNC: 101 MMOL/L (ref 98–107)
CO2: 27 MMOL/L (ref 20–31)
COLOR: ABNORMAL
COMMENT UA: ABNORMAL
CREAT SERPL-MCNC: 0.56 MG/DL (ref 0.5–0.9)
CRYSTALS, UA: ABNORMAL /HPF
EPITHELIAL CELLS UA: ABNORMAL /HPF (ref 0–5)
GFR AFRICAN AMERICAN: >60 ML/MIN
GFR NON-AFRICAN AMERICAN: >60 ML/MIN
GFR SERPL CREATININE-BSD FRML MDRD: ABNORMAL ML/MIN/{1.73_M2}
GFR SERPL CREATININE-BSD FRML MDRD: ABNORMAL ML/MIN/{1.73_M2}
GLUCOSE BLD-MCNC: 111 MG/DL (ref 70–99)
GLUCOSE URINE: NEGATIVE
KETONES, URINE: NEGATIVE
LEUKOCYTE ESTERASE, URINE: NEGATIVE
MUCUS: ABNORMAL
NITRITE, URINE: NEGATIVE
OTHER OBSERVATIONS UA: ABNORMAL
PH UA: 7 (ref 5–6)
POTASSIUM SERPL-SCNC: 4.2 MMOL/L (ref 3.7–5.3)
PROTEIN UA: NEGATIVE
RBC UA: ABNORMAL /HPF (ref 0–4)
RENAL EPITHELIAL, UA: ABNORMAL /HPF
SODIUM BLD-SCNC: 140 MMOL/L (ref 135–144)
SPECIFIC GRAVITY UA: 1.01 (ref 1.01–1.02)
TRICHOMONAS: ABNORMAL
TURBIDITY: ABNORMAL
URINE HGB: NEGATIVE
UROBILINOGEN, URINE: NORMAL
WBC UA: ABNORMAL /HPF (ref 0–4)
YEAST: ABNORMAL

## 2018-01-01 PROCEDURE — 4040F PNEUMOC VAC/ADMIN/RCVD: CPT | Performed by: FAMILY MEDICINE

## 2018-01-01 PROCEDURE — 1036F TOBACCO NON-USER: CPT | Performed by: NURSE PRACTITIONER

## 2018-01-01 PROCEDURE — 3288F FALL RISK ASSESSMENT DOCD: CPT | Performed by: NURSE PRACTITIONER

## 2018-01-01 PROCEDURE — 1090F PRES/ABSN URINE INCON ASSESS: CPT | Performed by: NURSE PRACTITIONER

## 2018-01-01 PROCEDURE — G0180 MD CERTIFICATION HHA PATIENT: HCPCS | Performed by: FAMILY MEDICINE

## 2018-01-01 PROCEDURE — 1090F PRES/ABSN URINE INCON ASSESS: CPT | Performed by: FAMILY MEDICINE

## 2018-01-01 PROCEDURE — 6360000002 HC RX W HCPCS: Performed by: PHYSICAL MEDICINE & REHABILITATION

## 2018-01-01 PROCEDURE — 4040F PNEUMOC VAC/ADMIN/RCVD: CPT | Performed by: NURSE PRACTITIONER

## 2018-01-01 PROCEDURE — G8417 CALC BMI ABV UP PARAM F/U: HCPCS | Performed by: FAMILY MEDICINE

## 2018-01-01 PROCEDURE — G8399 PT W/DXA RESULTS DOCUMENT: HCPCS | Performed by: NURSE PRACTITIONER

## 2018-01-01 PROCEDURE — 3600000056 HC PAIN LEVEL 4 BASE: Performed by: PHYSICAL MEDICINE & REHABILITATION

## 2018-01-01 PROCEDURE — 1123F ACP DISCUSS/DSCN MKR DOCD: CPT | Performed by: FAMILY MEDICINE

## 2018-01-01 PROCEDURE — 99214 OFFICE O/P EST MOD 30 MIN: CPT | Performed by: FAMILY MEDICINE

## 2018-01-01 PROCEDURE — G8484 FLU IMMUNIZE NO ADMIN: HCPCS | Performed by: NURSE PRACTITIONER

## 2018-01-01 PROCEDURE — 1036F TOBACCO NON-USER: CPT | Performed by: FAMILY MEDICINE

## 2018-01-01 PROCEDURE — G8417 CALC BMI ABV UP PARAM F/U: HCPCS | Performed by: NURSE PRACTITIONER

## 2018-01-01 PROCEDURE — 70450 CT HEAD/BRAIN W/O DYE: CPT

## 2018-01-01 PROCEDURE — G8427 DOCREV CUR MEDS BY ELIG CLIN: HCPCS | Performed by: NURSE PRACTITIONER

## 2018-01-01 PROCEDURE — 3209999900 FLUORO FOR SURGICAL PROCEDURES

## 2018-01-01 PROCEDURE — 2580000003 HC RX 258: Performed by: PHYSICAL MEDICINE & REHABILITATION

## 2018-01-01 PROCEDURE — 1123F ACP DISCUSS/DSCN MKR DOCD: CPT | Performed by: NURSE PRACTITIONER

## 2018-01-01 PROCEDURE — 6360000004 HC RX CONTRAST MEDICATION: Performed by: PHYSICAL MEDICINE & REHABILITATION

## 2018-01-01 PROCEDURE — 36415 COLL VENOUS BLD VENIPUNCTURE: CPT

## 2018-01-01 PROCEDURE — A6413 ADHESIVE BANDAGE, FIRST-AID: HCPCS | Performed by: PHYSICAL MEDICINE & REHABILITATION

## 2018-01-01 PROCEDURE — G8427 DOCREV CUR MEDS BY ELIG CLIN: HCPCS | Performed by: FAMILY MEDICINE

## 2018-01-01 PROCEDURE — 3288F FALL RISK ASSESSMENT DOCD: CPT | Performed by: FAMILY MEDICINE

## 2018-01-01 PROCEDURE — 99284 EMERGENCY DEPT VISIT MOD MDM: CPT

## 2018-01-01 PROCEDURE — G8399 PT W/DXA RESULTS DOCUMENT: HCPCS | Performed by: FAMILY MEDICINE

## 2018-01-01 PROCEDURE — 99213 OFFICE O/P EST LOW 20 MIN: CPT | Performed by: NURSE PRACTITIONER

## 2018-01-01 PROCEDURE — 80048 BASIC METABOLIC PNL TOTAL CA: CPT

## 2018-01-01 PROCEDURE — 99214 OFFICE O/P EST MOD 30 MIN: CPT | Performed by: NURSE PRACTITIONER

## 2018-01-01 PROCEDURE — 73000 X-RAY EXAM OF COLLAR BONE: CPT

## 2018-01-01 PROCEDURE — G8484 FLU IMMUNIZE NO ADMIN: HCPCS | Performed by: FAMILY MEDICINE

## 2018-01-01 PROCEDURE — G0179 MD RECERTIFICATION HHA PT: HCPCS | Performed by: FAMILY MEDICINE

## 2018-01-01 PROCEDURE — 2500000003 HC RX 250 WO HCPCS: Performed by: PHYSICAL MEDICINE & REHABILITATION

## 2018-01-01 PROCEDURE — 2500000003 HC RX 250 WO HCPCS

## 2018-01-01 PROCEDURE — 64483 NJX AA&/STRD TFRM EPI L/S 1: CPT | Performed by: PHYSICAL MEDICINE & REHABILITATION

## 2018-01-01 PROCEDURE — 64484 NJX AA&/STRD TFRM EPI L/S EA: CPT | Performed by: PHYSICAL MEDICINE & REHABILITATION

## 2018-01-01 PROCEDURE — 99212 OFFICE O/P EST SF 10 MIN: CPT | Performed by: NURSE PRACTITIONER

## 2018-01-01 PROCEDURE — 7100000010 HC PHASE II RECOVERY - FIRST 15 MIN: Performed by: PHYSICAL MEDICINE & REHABILITATION

## 2018-01-01 PROCEDURE — 72100 X-RAY EXAM L-S SPINE 2/3 VWS: CPT

## 2018-01-01 PROCEDURE — 81001 URINALYSIS AUTO W/SCOPE: CPT

## 2018-01-01 RX ORDER — 0.9 % SODIUM CHLORIDE 0.9 %
VIAL (ML) INJECTION PRN
Status: DISCONTINUED | OUTPATIENT
Start: 2018-01-01 | End: 2018-01-01 | Stop reason: HOSPADM

## 2018-01-01 RX ORDER — LIDOCAINE 50 MG/G
1 PATCH TOPICAL DAILY
Qty: 30 PATCH | Refills: 5 | Status: SHIPPED | OUTPATIENT
Start: 2018-01-01 | End: 2018-01-01 | Stop reason: ALTCHOICE

## 2018-01-01 RX ORDER — EPINEPHRINE 0.3 MG/.3ML
0.3 INJECTION SUBCUTANEOUS ONCE
Qty: 2 EACH | Refills: 0 | Status: SHIPPED | OUTPATIENT
Start: 2018-01-01 | End: 2018-01-01

## 2018-01-01 RX ORDER — LIDOCAINE HYDROCHLORIDE AND EPINEPHRINE 10; 10 MG/ML; UG/ML
20 INJECTION, SOLUTION INFILTRATION; PERINEURAL ONCE
Status: DISCONTINUED | OUTPATIENT
Start: 2018-01-01 | End: 2018-01-01

## 2018-01-01 RX ORDER — DEXAMETHASONE SODIUM PHOSPHATE 10 MG/ML
INJECTION INTRAMUSCULAR; INTRAVENOUS PRN
Status: DISCONTINUED | OUTPATIENT
Start: 2018-01-01 | End: 2018-01-01 | Stop reason: HOSPADM

## 2018-01-01 RX ORDER — NYSTATIN 100000 [USP'U]/G
POWDER TOPICAL 4 TIMES DAILY
COMMUNITY

## 2018-01-01 RX ORDER — HYDROCODONE BITARTRATE AND ACETAMINOPHEN 5; 325 MG/1; MG/1
1 TABLET ORAL EVERY 8 HOURS PRN
Qty: 56 TABLET | Refills: 0 | Status: SHIPPED | OUTPATIENT
Start: 2018-01-01 | End: 2018-01-01

## 2018-01-01 RX ORDER — DONEPEZIL HYDROCHLORIDE 10 MG/1
TABLET, FILM COATED ORAL
Qty: 30 TABLET | Refills: 0 | Status: SHIPPED | OUTPATIENT
Start: 2018-01-01

## 2018-01-01 RX ORDER — DOCUSATE SODIUM 100 MG/1
100 CAPSULE, LIQUID FILLED ORAL 2 TIMES DAILY
COMMUNITY

## 2018-01-01 RX ORDER — AZITHROMYCIN 250 MG/1
TABLET, FILM COATED ORAL
Qty: 1 PACKET | Refills: 0 | Status: SHIPPED | OUTPATIENT
Start: 2018-01-01 | End: 2018-01-01

## 2018-01-01 RX ORDER — WHEELCHAIR
EACH MISCELLANEOUS
Qty: 1 EACH | Refills: 0 | Status: SHIPPED | OUTPATIENT
Start: 2018-01-01

## 2018-01-01 RX ORDER — MONTELUKAST SODIUM 10 MG/1
TABLET ORAL
Qty: 90 TABLET | Refills: 3 | Status: SHIPPED | OUTPATIENT
Start: 2018-01-01

## 2018-01-01 RX ORDER — HYDROCODONE BITARTRATE AND ACETAMINOPHEN 10; 325 MG/1; MG/1
1 TABLET ORAL EVERY 6 HOURS PRN
COMMUNITY

## 2018-01-01 RX ORDER — FENTANYL 12 UG/H
1 PATCH TRANSDERMAL
Qty: 10 PATCH | Refills: 0 | Status: SHIPPED | OUTPATIENT
Start: 2018-01-01 | End: 2018-01-01 | Stop reason: SINTOL

## 2018-01-01 RX ORDER — GABAPENTIN 300 MG/1
300 CAPSULE ORAL 3 TIMES DAILY
Qty: 90 CAPSULE | Refills: 3 | Status: SHIPPED | OUTPATIENT
Start: 2018-01-01 | End: 2018-01-01

## 2018-01-01 RX ORDER — GABAPENTIN 100 MG/1
100 CAPSULE ORAL 3 TIMES DAILY
Qty: 90 CAPSULE | Refills: 3 | Status: SHIPPED | OUTPATIENT
Start: 2018-01-01 | End: 2018-01-01 | Stop reason: SDUPTHER

## 2018-01-01 RX ORDER — HYDROCODONE BITARTRATE AND ACETAMINOPHEN 10; 325 MG/1; MG/1
1 TABLET ORAL EVERY 6 HOURS PRN
Qty: 120 TABLET | Refills: 0 | Status: SHIPPED | OUTPATIENT
Start: 2018-01-01 | End: 2018-01-01

## 2018-01-01 RX ORDER — BUPIVACAINE HYDROCHLORIDE 5 MG/ML
INJECTION, SOLUTION EPIDURAL; INTRACAUDAL PRN
Status: DISCONTINUED | OUTPATIENT
Start: 2018-01-01 | End: 2018-01-01 | Stop reason: HOSPADM

## 2018-01-01 RX ORDER — MORPHINE SULFATE 15 MG/1
TABLET, FILM COATED, EXTENDED RELEASE ORAL
Refills: 0 | COMMUNITY
Start: 2018-01-01

## 2018-01-01 RX ORDER — LIDOCAINE HYDROCHLORIDE AND EPINEPHRINE BITARTRATE 20; .01 MG/ML; MG/ML
INJECTION, SOLUTION SUBCUTANEOUS
Status: COMPLETED
Start: 2018-01-01 | End: 2018-01-01

## 2018-01-01 RX ORDER — MIRTAZAPINE 15 MG/1
7.5 TABLET, FILM COATED ORAL NIGHTLY
Qty: 30 TABLET | Refills: 3 | Status: SHIPPED | OUTPATIENT
Start: 2018-01-01

## 2018-01-01 RX ORDER — LIDOCAINE HYDROCHLORIDE AND EPINEPHRINE BITARTRATE 20; .01 MG/ML; MG/ML
20 INJECTION, SOLUTION SUBCUTANEOUS ONCE
Status: COMPLETED | OUTPATIENT
Start: 2018-01-01 | End: 2018-01-01

## 2018-01-01 RX ADMIN — LIDOCAINE HYDROCHLORIDE,EPINEPHRINE BITARTRATE 20 ML: 20; .01 INJECTION, SOLUTION INFILTRATION; PERINEURAL at 14:43

## 2018-01-01 RX ADMIN — LIDOCAINE HYDROCHLORIDE AND EPINEPHRINE BITARTRATE 20 ML: 20; .01 INJECTION, SOLUTION SUBCUTANEOUS at 14:43

## 2018-01-01 ASSESSMENT — ENCOUNTER SYMPTOMS
COUGH: 0
WHEEZING: 0
TROUBLE SWALLOWING: 0
GASTROINTESTINAL NEGATIVE: 1
CONSTIPATION: 0
DIARRHEA: 0
BACK PAIN: 1
NAUSEA: 0
GASTROINTESTINAL NEGATIVE: 1
ABDOMINAL PAIN: 0
BACK PAIN: 1
SHORTNESS OF BREATH: 0
SINUS PRESSURE: 0
EYES NEGATIVE: 1
CONSTIPATION: 1
NAUSEA: 0
SINUS PAIN: 0
DIARRHEA: 0
NAUSEA: 0
BACK PAIN: 1
CHEST TIGHTNESS: 0
ABDOMINAL PAIN: 0
VOMITING: 0
CHEST TIGHTNESS: 0
ALLERGIC/IMMUNOLOGIC NEGATIVE: 1
BACK PAIN: 1
SHORTNESS OF BREATH: 0
COUGH: 0
CHEST TIGHTNESS: 0
CONSTIPATION: 0
BACK PAIN: 1
DIARRHEA: 0
ABDOMINAL PAIN: 1
EYES NEGATIVE: 1
NAUSEA: 0
WHEEZING: 0
WHEEZING: 0
RHINORRHEA: 0
SHORTNESS OF BREATH: 0
ABDOMINAL PAIN: 0
CHEST TIGHTNESS: 0
CONSTIPATION: 0
DIARRHEA: 0
BACK PAIN: 1
SHORTNESS OF BREATH: 0
RESPIRATORY NEGATIVE: 1
COUGH: 0
COUGH: 0
EYES NEGATIVE: 1
RESPIRATORY NEGATIVE: 1
RESPIRATORY NEGATIVE: 1

## 2018-01-01 ASSESSMENT — PAIN SCALES - WONG BAKER
WONGBAKER_NUMERICALRESPONSE: 6
WONGBAKER_NUMERICALRESPONSE: 8

## 2018-01-01 ASSESSMENT — PAIN DESCRIPTION - DESCRIPTORS: DESCRIPTORS: SORE

## 2018-01-01 ASSESSMENT — PAIN DESCRIPTION - ORIENTATION
ORIENTATION: LEFT;POSTERIOR
ORIENTATION: RIGHT;LEFT
ORIENTATION: LEFT;POSTERIOR

## 2018-01-01 ASSESSMENT — PAIN DESCRIPTION - ONSET
ONSET: SUDDEN
ONSET: ON-GOING

## 2018-01-01 ASSESSMENT — PAIN - FUNCTIONAL ASSESSMENT
PAIN_FUNCTIONAL_ASSESSMENT: 0-10
PAIN_FUNCTIONAL_ASSESSMENT: 0-10

## 2018-01-01 ASSESSMENT — PAIN DESCRIPTION - FREQUENCY
FREQUENCY: CONTINUOUS
FREQUENCY: INTERMITTENT
FREQUENCY: CONTINUOUS

## 2018-01-01 ASSESSMENT — PAIN DESCRIPTION - LOCATION
LOCATION: BACK
LOCATION: HEAD
LOCATION: HEAD

## 2018-01-01 ASSESSMENT — PAIN DESCRIPTION - PAIN TYPE
TYPE: SURGICAL PAIN
TYPE: ACUTE PAIN
TYPE: ACUTE PAIN

## 2018-01-01 ASSESSMENT — PAIN DESCRIPTION - PROGRESSION
CLINICAL_PROGRESSION: GRADUALLY WORSENING
CLINICAL_PROGRESSION: GRADUALLY IMPROVING

## 2018-01-01 ASSESSMENT — PAIN SCALES - GENERAL
PAINLEVEL_OUTOF10: 0
PAINLEVEL_OUTOF10: 2
PAINLEVEL_OUTOF10: 8

## 2018-01-12 NOTE — ED PROVIDER NOTES
interpretations:  CT Head WO Contrast    (Results Pending)   XR Clavicle Left    (Results Pending)     Xr Clavicle Left    Result Date: 1/12/2018  EXAMINATION: 2 VIEWS OF THE LEFT CLAVICLE 1/12/2018 12:30 pm COMPARISON: January 21, 2010 HISTORY: ORDERING SYSTEM PROVIDED HISTORY: fall TECHNOLOGIST PROVIDED HISTORY: Reason for exam:->fall Ordering Physician Provided Reason for Exam: Fall; left clavicle pain Acuity: Acute Type of Exam: Initial FINDINGS: There is no fracture malalignment identified. Moderately severe degenerative change and osseous overgrowth is noted in the glenohumeral joint. Synovial osteochondromatosis is noted. There is mild degenerative change in the acromioclavicular joint as well. These findings are without significant change. The visualized lung fields reveal no acute findings. Degenerative change with prominent facet arthropathy is present in the visualized cervical spine. 1.  No significant change in moderately severe arthropathy in the shoulder and osteochondromatosis. 2.  Degenerative change in the visualized cervical spine is again demonstrated. Ct Head Wo Contrast    Result Date: 1/12/2018  EXAMINATION: CT OF THE HEAD WITHOUT CONTRAST  1/12/2018 12:23 pm TECHNIQUE: CT of the head was performed without the administration of intravenous contrast. Dose modulation, iterative reconstruction, and/or weight based adjustment of the mA/kV was utilized to reduce the radiation dose to as low as reasonably achievable. COMPARISON: August 1, 2016 HISTORY: ORDERING SYSTEM PROVIDED HISTORY: fall TECHNOLOGIST PROVIDED HISTORY: Ordering Physician Provided Reason for Exam: Fall; hit left side of head; abrasion to left side of head Acuity: Acute Type of Exam: Initial FINDINGS: BRAIN/VENTRICLES: There is no acute intracranial hemorrhage, mass effect or midline shift. No abnormal extra-axial fluid collection.   Mild cortical atrophy, scattered white matter changes in the brain and associated

## 2018-01-12 NOTE — ED NOTES
Telfa applied to head wound, head is wrapped lightly with roll gauze to keep telfa in place. Patient tolerated well. Daughter verbalizes understanding of wound care.      Prosper Judge RN  01/12/18 1346

## 2018-01-12 NOTE — ED NOTES
Patient was assisted to bedside commode. She does well getting up. Patient denies dizziness with position changes. Daughter has clean brief and change of clothes for patient. Writer and daughter assisted her with this. Patient does well and is able to assist and do most of the activity on her own. Patient is returned to bed in preparation for suturing.      Ramírez Chacko RN  01/12/18 [id number]

## 2018-01-19 NOTE — TELEPHONE ENCOUNTER
1/19/18                                                                                                                                                                         To: Harlan Doss NP   [address] / [place] [address]       From:  Araceli Alvarez MD   Interventional Pain Mgmt Physician, [facility]    Re: Medication Hiatus for Interventional Pain/Spine Procedure for Milo Xavier    Dear Harlan Doss NP                   I am recommending an injection for Milo Xavier to decrease their spine related pain. In order to perform this procedure, antiplatelet/anticoagulant medications will need to be held prior to the procedure. Please respond with one of the options below if you feel that it is safe for Milo Xavier to hold this medication and return this letter to me. Milo Xavier is taking: ASA     Medications that must be held prior to injections:     Antiplatelets Aspirin, Effient, Plavix,Ticlid,  Brilinta, Savaysa for 3-5 days   Anticoagulant: Coumadin, Eliquis, Lovenox, Heparin, Pradaxa, Xarelto for 5-7 days. All antiplatelets/anticoagulants must be held for 7 days for a spinal cord stimulator. .    Sincerely,       Electronically signed by Luis Valencia NP on 1/19/2018 at 11:14 AM    Araceli Alvarez MD      ·  I agree with holding this medication for the time described above. ·  I agree with holding this medication for ____ days only. ·  I agree with holding this medication for ____ days, however pt must be on another anticoagulant, _______________.   · I  DO NOT agree with holding this medication

## 2018-01-19 NOTE — PATIENT INSTRUCTIONS
[facility]  [address] [address] [address], [address]  Telephone [phone]  Fax [phone]      PROCEDURE INSTRUCTIONS FOR  PAIN MANAGEMENT PROCEDURES WITHOUT IV SEDATION    Hussein Bull scheduled to see Dr. Stephanie Cardoso to undergo the following procedure:  Bilateral L4, Transforaminal Epidural Steroid Injection    Procedure Date: ____Friday 1/26/18____  Arrival Time: ____11:30am____     Report to the [place] [address], Registration office on the 1st floor in the hospital, after check in and signing of paper work you will then go to the second floor to the surgery center. 1. Stop the following medications prior to the procedure:    Aspirin  Date__1/21/18__ : Stop blood thinners as directed before the injection, with permission from your cardiologist or primary care physician. We will send a letter to them requesting permission to hold the blood thinners. · Medication___Aspirin___ held for __5__ days  ·   2. Take all routine medications unless otherwise instructed. Ok to take vitamins and antiinflammatory medications    3. EATING & DRINKING:  Ok to eat or drink before the injection - no IV sedation will be used. 4. If you are allergic to contrast or iodine, you must take benadryl and prednisone prior to the injection to prevent an allergic reaction. Follow the directions on the prescription for the times to take the medication. 5. Oral valium can be prescribed if your are anxious about the injections or feel that you can not lay still during the injection. If you take valium, you must have a . Make arrangements for a family member or friend to drive you to the surgery center. Your ride must stay in the hospital while you are having the injection done. If they cannot stay, the injection will be rescheduled. The valium may affect your judgment following the procedure and driving a vehicle within 24 hours after the sedation could be dangerous.     6.  Wear simple loose

## 2018-01-23 NOTE — PROGRESS NOTES
Presents with daughter for suture removal.  She had a fall with resultant scalp laceration January 12th. Scalp wound is well approximated with a large amount of dried red, brown exudate adhering hair to sutures. Three sutures removed. Tolerated well. Wound care discussed. Patient and daughter verbalize understanding.

## 2018-01-26 NOTE — H&P
bilateral    Right leg pain       s/p fall    TIA (transient ischemic attack)      Transient ischemic attack      Trigeminal neuralgia       possible, left side    Urinary incontinence      Vascular dementia              Past Surgical History         Past Surgical History:   Procedure Laterality Date    BLADDER SUSPENSION   2002    BLADDER SUSPENSION   2003     revision    BREAST BIOPSY Right       benign    CATARACT REMOVAL WITH IMPLANT Bilateral 2009    COLONOSCOPY   2005    COLONOSCOPY   2009     Dr. Milan Rangel COLONOSCOPY   12/11/12     Polyp in transverse colon (adenoma).  COLONOSCOPY   6/11/13     Tiny polyps x2 (adenomatous, hyperplastic). Diverticulosis.  LUMBAR SPINE SURGERY Right 10/3/2017     Right L4 & L5 TRANSFORAMINAL performed by Sariah Bhandari MD at [id number] [place] [address] Dr  11/3/2017     Right L4 & L5 Transforaminal performed by Sariah Bhandari MD at [address] [facility] FACET LEVEL 1 BILATERAL Bilateral 9/15/2017     Bilat L3 / L4 L4 / L5 L5/ S1 Facet Inj performed by Sariah Bhandari MD at [address] [place] [address]   June 2010     Basal cell carcinoma removal, Dr. J Carlos Gordon   Nov 2010     Basal cell carcinoma removal, Dr. Ananya Patel [facility] 2003   [place] ENDOSCOPY   2007     with modified barium swallow            Family History   Family History   Problem Relation Age of Onset    Heart Failure Mother      Stroke Father      COPD Sister      COPD Sister              Social History   Social History            Social History    Marital status:         Spouse name: N/A    Number of children: N/A    Years of education: N/A           Social History Main Topics    Smoking status: Never Smoker    Smokeless tobacco: Never Used    Alcohol use No    Drug use: No    Sexual activity: Not Asked           Other Topics Concern    None          Social multilevel degenerative disc space narrowing. Advanced multilevel facet arthropathy. Mild scoliotic curvature, apex to the right centered at L2. Scattered visceral vascular calcification. AP pelvis right hip: No acute fracture deformity. Moderate osteoarthritic changes of the hips, with axial narrowing and marginal spurring. The SI joints are unremarkable. No focal soft tissue abnormality. Postoperative hardware in the low pelvis. 1. No acute osseous abnormality of the lumbar spine. Advanced multilevel lumbar spondylosis and facet arthropathy. 2. No acute osseous abnormality of the pelvis or right hip. Moderate osteoarthritic changes of the hips bilaterally. Mri Lumbar Spine Wo Contrast    Result Date: 9/27/2017  EXAMINATION: MRI OF THE LUMBAR SPINE WITHOUT CONTRAST, 9/27/2017 4:11 pm TECHNIQUE: Multiplanar multisequence MRI of the lumbar spine was performed without the administration of intravenous contrast. COMPARISON: Lumbar radiographs September 20, 2017 HISTORY: ORDERING SYSTEM PROVIDED HISTORY: Low back pain radiating to right leg TECHNOLOGIST PROVIDED HISTORY: Reason for exam:->low back pain with radiculopathy Ordering Physician Provided Reason for Exam: Low back pain radiating to right hip Acuity: Unknown Type of Exam: Ongoing Additional signs and symptoms: Low back pain radiating to right leg Relevant Medical/Surgical History: xray 9-20-17 FINDINGS: BONES/ALIGNMENT: There is normal alignment of the spine except for moderate dextroconvex scoliosis. The vertebral body heights are maintained. The bone marrow signal appears unremarkable. SPINAL CORD: The conus terminates normally. SOFT TISSUES: No paraspinal mass identified. L1-L2: There is mild narrowing of the inferior neural foramina bilaterally due to hypertrophic facet disease. The central canal is patent.  L2-L3: There is a moderate circumferential annular bulge and severe facet hypertrophy causing moderately severe narrowing of the neural visceral vascular calcification. AP pelvis right hip: No acute fracture deformity. Moderate osteoarthritic changes of the hips, with axial narrowing and marginal spurring. The SI joints are unremarkable. No focal soft tissue abnormality. Postoperative hardware in the low pelvis. 1. No acute osseous abnormality of the lumbar spine. Advanced multilevel lumbar spondylosis and facet arthropathy. 2. No acute osseous abnormality of the pelvis or right hip. Moderate osteoarthritic changes of the hips bilaterally. Neurological: She is alert and oriented to person, place, and time. No cranial nerve deficit. GCS eye subscore is 4. GCS verbal subscore is 5. GCS motor subscore is 6. Skin: Skin is warm, dry and intact. No rash noted. She is not diaphoretic. No cyanosis or erythema. No pallor. Nails show no clubbing. Psychiatric: She has a normal mood and affect. Her speech is normal and behavior is normal.   Vitals reviewed. Assessment:      1. Neural foraminal stenosis of lumbar spine    2. Lumbar radiculopathy    3. Chronic bilateral low back pain, with sciatica presence unspecified                     Plan:    Schedule bilateral L4 TFE, if no relief, bilateral L5 TFE  Informed consent has been obtained for procedure. Rogelio Nazario  on blood thinners. Medications to hold have been reviewed with patient. Blood thinners must be held with permission from your cardiologist or primary care physician. A letter is  required to besent to PCP/Cardiologist regarding holding medications for procedure to decrease bleeding risk. Controlled Substances Monitoring:      Attestation: The Prescription Monitoring Report for this patient was reviewed today. Abraham Loving NP)  Documentation: No signs of potential drug abuse or diversion identified.  Abraham Loving NP)

## 2018-01-26 NOTE — OP NOTE
stable condition prior to discharge from the procedure center with discharge instructions. EBL: no blood loss    SPECIMEN: none    IMPRESSIONS:  1. BL4 transforaminal epidurogram, epidural anesthesia and epidural steroid injection procedures accomplished without incident. RECOMMENDATIONS:  1. Complete and return Post-Procedure Pain and Activity Diary.   2. Contact the [address] for symptom exacerbation, fever or unusual symptoms. 3. Post-procedure care according to verbal and written discharge instructions    POST-PROCEDURE EPIDUROGRAPHY INTERPRETATION:    EXAMINATION: AP, lateral, and oblique views    FLUORO TIME: 14 seconds    DISCUSSION: Spot views of the spine reveal normal alignment and segmentation. Spinal needle is positioned at the BL4 neuroforamin. Contrast spreads and outlines the BL4 nerve/ neuroforamin and epidural space. The epidurogram reveals excellent contrast flow. Visualized spine reveals See radiology report. Soft tissues reveal no abnormalities. IMPRESSION: BL4 transforaminal epidurogram/epineurogram reveals satisfactory needle position and contrast spread.      Electronically signed by Earl Hammond MD on 1/26/2018 at 12:43 PM

## 2018-02-08 NOTE — PROGRESS NOTES
 Essential tremor     Fatigue     GERD (gastroesophageal reflux disease)     Herpes keratitis     left eye    Hyperactivity of bladder     Hyperlipidemia     Left shoulder pain     Memory problem     ARIADNA on CPAP     Osteoarthritis     Peripheral neuropathy (HCC)     Pseudophakia     bilateral    Right leg pain     s/p fall    TIA (transient ischemic attack)     Transient ischemic attack     Trigeminal neuralgia     possible, left side    Urinary incontinence     Vascular dementia           Social History   Substance Use Topics    Smoking status: Never Smoker    Smokeless tobacco: Never Used    Alcohol use No      Current Outpatient Prescriptions   Medication Sig Dispense Refill    HYDROcodone-acetaminophen (NORCO) 5-325 MG per tablet Take 1 tablet by mouth every 8 hours as needed for Pain for up to 30 days. 56 tablet 0    donepezil (ARICEPT) 10 MG tablet TAKE 1 TABLET DAILY 30 tablet 0    memantine (NAMENDA) 10 MG tablet TAKE 1 TABLET TWICE A  tablet 3    gabapentin (NEURONTIN) 100 MG capsule Gabapentin (Neurontin) Dosing Instructions: 100mg every night for 3 nights, then 100mg every 12 hours for 3 days, then 100mg every 8 hours. (Patient taking differently: 100 mg 2 times daily Gabapentin (Neurontin) Dosing Instructions: 100mg every night for 3 nights, then 100mg every 12 hours for 3 days, then 100mg every 8 hours. Candy Idris ) 90 capsule 3    sertraline (ZOLOFT) 100 MG tablet TAKE 1 TABLET DAILY 90 tablet 3    simethicone (MYLICON) 859 MG chewable tablet Take 1 tablet by mouth every 6 hours as needed for Flatulence 30 tablet 0    oxybutynin (DITROPAN XL) 5 MG extended release tablet Take 1 tablet by mouth daily 90 tablet 3    aspirin 325 MG EC tablet Take 1 tablet by mouth daily 30 tablet     montelukast (SINGULAIR) 10 MG tablet TAKE 1 TABLET DAILY 90 tablet 3    Cholecalciferol (VITAMIN D3) 5000 UNITS TABS Take 1 tablet by mouth daily      vitamin E 400 UNIT capsule Take 400 Units by

## 2018-02-25 NOTE — PROGRESS NOTES
External ear normal. A middle ear effusion is present. Nose: Right sinus exhibits frontal sinus tenderness. Right sinus exhibits no maxillary sinus tenderness. Left sinus exhibits frontal sinus tenderness. Left sinus exhibits no maxillary sinus tenderness. Mouth/Throat: Oropharynx is clear and moist and mucous membranes are normal. No oropharyngeal exudate or posterior oropharyngeal erythema. White fluid noted in her ears Bilat. PND+   Eyes: Conjunctivae and EOM are normal. Pupils are equal, round, and reactive to light. Neck: Normal range of motion. Neck supple. Cardiovascular: Normal rate, regular rhythm, normal heart sounds and intact distal pulses. Pulmonary/Chest: Effort normal and breath sounds normal. No respiratory distress. Abdominal: Soft. Bowel sounds are normal.   Musculoskeletal: Normal range of motion. Neurological: She is alert and oriented to person, place, and time. Skin: Skin is warm and dry. Psychiatric: She has a normal mood and affect. Her behavior is normal. Judgment and thought content normal.       Assessment:      1. Acute bacterial sinusitis  azithromycin (ZITHROMAX) 250 MG tablet   2. Allergic rhinitis, unspecified chronicity, unspecified seasonality, unspecified trigger         Plan:      Advised to take an OTC allergy medication as well for a couple of weeks. Discussed use, benefit, and side effects of prescribed medications. All patient questions answered. Pt voiced understanding. Follow up PRN.       Electronically signed by  Geoffrey Payne NP on 2/25/2018 at 2:18 PM

## 2018-03-06 NOTE — TELEPHONE ENCOUNTER
3/6/18                                                                                                                                                                         To: Salma Dumont MD   [address] [facility] [address] / [place] [address]       From:  Merline Rule, MD   Interventional Pain Mgmt Physician, [facility]    Re: Medication Hiatus for Interventional Pain/Spine Procedure for Davidson Mijares    Dear Salma Dumont MD                   I am recommending an injection for Davidson Mijares to decrease their spine related pain. In order to perform this procedure, antiplatelet/anticoagulant medications will need to be held prior to the procedure. Please respond with one of the options below if you feel that it is safe for Davidson Mijares to hold this medication and return this letter to me. Davidson Mijares is taking: ASA    Medications that must be held prior to injections:     Antiplatelets Aspirin, Effient, Plavix,Ticlid,  Brilinta, Savaysa for 3-5 days   Anticoagulant: Coumadin, Eliquis, Lovenox, Heparin, Pradaxa, Xarelto for 5-7 days. All antiplatelets/anticoagulants must be held for 7 days for a spinal cord stimulator. .    Sincerely,       Electronically signed by Carlitos Quach NP on 3/6/2018 at 2:49 PM    Merline Rule, MD      ·  I agree with holding this medication for the time described above. ·  I agree with holding this medication for ____ days only. ·  I agree with holding this medication for ____ days, however pt must be on another anticoagulant, _______________.   · I  DO NOT agree with holding this medication

## 2018-03-06 NOTE — PATIENT INSTRUCTIONS
prior to surgery; patients under the age of 25 must have a parent or legal guardian sign the permit to be able to do the procedure. 9.  You must have finished any antibiotic prescribed for recent infections. If required, please take pre-procedure antibiotic or other pre-procedure medications as instructed. 10. Bring inhalers and pain medications with you to your procedure. 11. Bring your MRI/CT films if they were done outside of the [facility]. 12. If you should develop a cold, sore throat, cough, fever or other new indication of illness or infection, or are started on antibiotics within 2 weeks of the scheduled procedure, please notify the [facility] office as early as possible at [phone].

## 2018-03-06 NOTE — PROGRESS NOTES
HISTORY: Low back pain radiating to right leg TECHNOLOGIST PROVIDED HISTORY: Reason for exam:->low back pain with radiculopathy Ordering Physician Provided Reason for Exam: Low back pain radiating to right hip Acuity: Unknown Type of Exam: Ongoing Additional signs and symptoms: Low back pain radiating to right leg Relevant Medical/Surgical History: xray 9-20-17 FINDINGS: BONES/ALIGNMENT: There is normal alignment of the spine except for moderate dextroconvex scoliosis. The vertebral body heights are maintained. The bone marrow signal appears unremarkable. SPINAL CORD: The conus terminates normally. SOFT TISSUES: No paraspinal mass identified. L1-L2: There is mild narrowing of the inferior neural foramina bilaterally due to hypertrophic facet disease. The central canal is patent. L2-L3: There is a moderate circumferential annular bulge and severe facet hypertrophy causing moderately severe narrowing of the neural foramina, right greater than left. The thecal sac is patent. L3-L4: There is a moderate circumferential bulge and facet hypertrophy causing moderately severe narrowing of the neural foramina bilaterally. The thecal sac is patent. L4-L5: There is a moderate circumferential annular bulge and moderate posterior central disc protrusion with severe hypertrophic facet disease causing together severe central and lateral trefoil type spinal stenosis. The thecal sac measures 5-6 mm in the midline. L5-S1: There is a moderate posterior broad-based disc protrusion and severe hypertrophic facet disease causing severe narrowing of the neural foramina, left greater than right. The central thecal sac measures 8 mm in the midline. Multilevel degenerative disc disease, scoliosis, and spondylosis, in particular, at L4-5 and L5-S1, where there is severe central and lateral spinal stenosis.  RECOMMENDATIONS: The findings were sent to the Radiology Results [address] at 5:53 pm on 9/27/2017to be communicated to a

## 2018-03-07 NOTE — TELEPHONE ENCOUNTER
Daughter calling stating that pt is more confused than normal. Daughter also states that her urine smells very strong. Daughter is wondering what should be done. Daughter states that pt is incontinent and doesn't know if she will be able to give a urine sample.

## 2018-03-07 NOTE — TELEPHONE ENCOUNTER
Called daughter  Rut Hanna- explained need ua before we can tx, she is stopping in family practice to  all the stuff to do it at home and will bring back urine sample to lab

## 2018-03-09 NOTE — H&P
Expand All Collapse All    Hide copied text  Subjective:      Patient ID: Litzy Cagle is a 80 y.o. female. Chief Complaint   Patient presents with    Back Pain       follow up post TFE 1/26/18      HPI Underwent bilateral TFE L4 1/26/18 without much relief. Majority of her pain is now right lumbar     Pain Assessment  Location of Pain: Back  Location Modifiers:  (pain runs from back around to stomach)  Severity of Pain: 5  Quality of Pain: Aching  Duration of Pain: Persistent  Frequency of Pain: Constant  Aggravating Factors: Bending, Standing, Walking (everything makes pain worse)  Limiting Behavior: Yes  Relieving Factors: Ice  Are there other pain locations you wish to document?: No     Allergies   Allergen Reactions    Bee Venom Anaphylaxis       Allergic to bee stings    Codeine         Tylenol with Codeine makes her sick to her stomach     Sulfa Antibiotics           Active Medications          Outpatient Prescriptions Marked as Taking for the 3/6/18 encounter (Office Visit) with Maday Pastor NP   Medication Sig Dispense Refill    azithromycin (ZITHROMAX) 250 MG tablet Take 2 tabs (500 mg) on Day 1, and take 1 tab (250 mg) on days 2 through 5. 1 packet 0    gabapentin (NEURONTIN) 100 MG capsule Take 1 capsule by mouth 3 times daily for 30 days.  90 capsule 3    donepezil (ARICEPT) 10 MG tablet TAKE 1 TABLET DAILY 30 tablet 0    memantine (NAMENDA) 10 MG tablet TAKE 1 TABLET TWICE A  tablet 3    sertraline (ZOLOFT) 100 MG tablet TAKE 1 TABLET DAILY 90 tablet 3    simethicone (MYLICON) 160 MG chewable tablet Take 1 tablet by mouth every 6 hours as needed for Flatulence 30 tablet 0    oxybutynin (DITROPAN XL) 5 MG extended release tablet Take 1 tablet by mouth daily 90 tablet 3    aspirin 325 MG EC tablet Take 1 tablet by mouth daily 30 tablet      montelukast (SINGULAIR) 10 MG tablet TAKE 1 TABLET DAILY 90 tablet 3    Cholecalciferol (VITAMIN D3) 5000 UNITS TABS

## 2018-03-09 NOTE — OP NOTE
2. RL4,5 transforaminal epidural anesthetic injection. 3. RL4,5 transforaminal epidural steroid injection. CONSENT: Written consent was obtained from the patient on preprinted consent form after explaining the procedure, indications, potential complications and outcomes. Alternative treatments were also discussed. DISCUSSION: The patient was sterilely prepped and draped in the usual fashion in the prone position. Time out was verified for correct patient, side, level and procedure. SEDATION no sedation    PROCEDURE:  Under image-intensifier control, a 22 gauge needle x 5 inch spinal needle was guided successfully into the epidural space employing a posterior lateral/oblique approach. Needle aspiration was negative for heme or CSF. Instillation of  .5 mL of Omnipaque 240 contrast medium opacified the spinal nerve and demonstrated contiguous flow into the  RL 4epidural space. No vascular spread was noted. Digital subtraction was not employed to evaluate for vascular spread. The patient was monitored for any untoward reaction to contrast medium before proceeding with procedure #2. The patient did not report pain reproduction in a concordant distribution. Following needle position verification, a test dose of .5 mL of sterile saline was administered and patient monitored for any adverse effects. Then, 1 mL of  Betamethasone (Celestone 6 mg/mL) and Methylprednisolone (Depo-Medrol 80 mg/mL) was instilled into the epidural space and the patient's response was again monitored. Finally,  1 ml of 0.5% bupivacaine was then instilled. The patient's response was again monitored. The spinal needle was removed. Instillation of  .5 mL of Omnipaque 240 contrast medium opacified the spinal nerve and demonstrated contiguous flow into the  RL 5epidural space. No vascular spread was noted. Digital subtraction was not employed to evaluate for vascular spread.  The patient was monitored for any untoward

## 2018-04-30 NOTE — TELEPHONE ENCOUNTER
10/24/17                                                                                                                                                                         To: Celia Crowe NP   [id number] / [place] [id number]       From:  Rodrigo Cox MD   Interventional Pain Mgmt Physician, [facility]    Re: Medication Hiatus for Interventional Pain/Spine Procedure for [place] Marc    Dear Celia Crowe NP                   I am recommending an injection for Karen [place] to decrease their spine related pain. In order to perform this procedure, antiplatelet/anticoagulant medications will need to be held prior to the procedure. Please respond with one of the options below if you feel that it is safe for [place] to hold this medication and return this letter to me. Karen Marc is taking: ASA    Medications that must be held prior to injections:     Antiplatelets Aspirin, Effient, Plavix,Ticlid,  Brilinta, Savaysa for 3-5 days   Anticoagulant: Coumadin, Eliquis, Lovenox, Heparin, Pradaxa, Xarelto for 5-7 days. All antiplatelets/anticoagulants must be held for 7 days for a spinal cord stimulator. .    Sincerely,       Electronically signed by Kathryn Acuna NP on 10/24/2017 at 2:00 PM    Rodrigo Cox MD      ·  I agree with holding this medication for the time described above. ·  I agree with holding this medication for ____ days only. ·  I agree with holding this medication for ____ days, however pt must be on another anticoagulant, _______________.   · I  DO NOT agree with holding this medication
Left message for patient's daughter and to call back if has questions.
Please let Attila Robert know it is okay to hold Leonela's aspirin for 3-5 days prior to this injection and prior to any future injections.
Home

## 2018-05-08 NOTE — TELEPHONE ENCOUNTER
Called and notified the pharmacist of Zunilda's response.
[facility] in [facility] called on 10/24/17 and left a message asking for Justin Bella to clarify the instructions on the pt's script for Norco 5/325mg. There are 2 sets of instructions 1 tab Q 8 HR PRN Disp #42 or 1 tab Q 4 HR PRN Disp #42? Please advise.
independent with crutches

## (undated) DEVICE — GLOVE ORANGE PI 8   MSG9080

## (undated) DEVICE — GLOVE SURG SZ 65 THK91MIL LTX FREE SYN POLYISOPRENE

## (undated) DEVICE — GLOVE ORANGE PI 7   MSG9070

## (undated) DEVICE — TRAY PAIN CUST

## (undated) DEVICE — LINE SAMP O2 6.5FT W/FEMALE CONN F/ADULT CAPNOLINE PLUS

## (undated) DEVICE — GLOVE SURG SZ 8 L12IN THK91MIL BRN LTX FREE POLYCHLOROPRENE

## (undated) DEVICE — NEEDLE SPNL 22GA L5IN BLK HUB S STL W/ QNCKE PNT W/OUT

## (undated) DEVICE — BANDAGE ADH DIA7/8IN NAT FLEX-FABRIC WVN FOR WND PROTCT

## (undated) DEVICE — CHLORAPREP 26ML CLEAR

## (undated) DEVICE — Z DISCONTINUED USE 2624852 GLOVE SURG 7 PF TEXT NEOPRNE BRN STRL NEOLON 2G LF